# Patient Record
Sex: FEMALE | Race: AMERICAN INDIAN OR ALASKA NATIVE | NOT HISPANIC OR LATINO | Employment: UNEMPLOYED | URBAN - METROPOLITAN AREA
[De-identification: names, ages, dates, MRNs, and addresses within clinical notes are randomized per-mention and may not be internally consistent; named-entity substitution may affect disease eponyms.]

---

## 2017-03-28 PROBLEM — R31.29 MICROHEMATURIA: Status: ACTIVE | Noted: 2017-03-28

## 2017-11-18 ENCOUNTER — HOSPITAL ENCOUNTER (EMERGENCY)
Facility: HOSPITAL | Age: 37
Discharge: HOME | End: 2017-11-18
Attending: SURGERY
Payer: COMMERCIAL

## 2017-11-18 VITALS
TEMPERATURE: 98 F | SYSTOLIC BLOOD PRESSURE: 140 MMHG | HEIGHT: 67 IN | DIASTOLIC BLOOD PRESSURE: 70 MMHG | BODY MASS INDEX: 45.99 KG/M2 | OXYGEN SATURATION: 98 % | WEIGHT: 293 LBS | RESPIRATION RATE: 18 BRPM | HEART RATE: 80 BPM

## 2017-11-18 DIAGNOSIS — M54.9 BACK PAIN: ICD-10-CM

## 2017-11-18 LAB
ALBUMIN SERPL BCP-MCNC: 3.9 G/DL
ALP SERPL-CCNC: 65 U/L
ALT SERPL W/O P-5'-P-CCNC: 41 U/L
AMPHET+METHAMPHET UR QL: NEGATIVE
ANION GAP SERPL CALC-SCNC: 12 MMOL/L
APTT BLDCRRT: 27.4 SEC
AST SERPL-CCNC: 31 U/L
B-HCG UR QL: NEGATIVE
BARBITURATES UR QL SCN>200 NG/ML: NEGATIVE
BASOPHILS # BLD AUTO: 0.03 K/UL
BASOPHILS NFR BLD: 0.4 %
BENZODIAZ UR QL SCN>200 NG/ML: NEGATIVE
BILIRUB SERPL-MCNC: 1.1 MG/DL
BILIRUB UR QL STRIP: NEGATIVE
BNP SERPL-MCNC: <10 PG/ML
BUN SERPL-MCNC: 16 MG/DL
BZE UR QL SCN: NEGATIVE
CALCIUM SERPL-MCNC: 9.6 MG/DL
CANNABINOIDS UR QL SCN: NEGATIVE
CHLORIDE SERPL-SCNC: 105 MMOL/L
CK MB SERPL-MCNC: 1.8 NG/ML
CK MB SERPL-RTO: 0.7 %
CK SERPL-CCNC: 243 U/L
CK SERPL-CCNC: 243 U/L
CLARITY UR: CLEAR
CO2 SERPL-SCNC: 24 MMOL/L
COLOR UR: YELLOW
CREAT SERPL-MCNC: 1.1 MG/DL
CREAT UR-MCNC: 125.4 MG/DL
D DIMER PPP IA.FEU-MCNC: <0.19 MG/L FEU
DIFFERENTIAL METHOD: ABNORMAL
EOSINOPHIL # BLD AUTO: 0.3 K/UL
EOSINOPHIL NFR BLD: 3.8 %
ERYTHROCYTE [DISTWIDTH] IN BLOOD BY AUTOMATED COUNT: 14.8 %
EST. GFR  (AFRICAN AMERICAN): >60 ML/MIN/1.73 M^2
EST. GFR  (NON AFRICAN AMERICAN): >60 ML/MIN/1.73 M^2
GLUCOSE SERPL-MCNC: 101 MG/DL
GLUCOSE UR QL STRIP: NEGATIVE
HCT VFR BLD AUTO: 40.1 %
HGB BLD-MCNC: 13 G/DL
HGB UR QL STRIP: NEGATIVE
INR PPP: 1
KETONES UR QL STRIP: NEGATIVE
LEUKOCYTE ESTERASE UR QL STRIP: NEGATIVE
LYMPHOCYTES # BLD AUTO: 2.7 K/UL
LYMPHOCYTES NFR BLD: 33.8 %
MCH RBC QN AUTO: 25.8 PG
MCHC RBC AUTO-ENTMCNC: 32.4 G/DL
MCV RBC AUTO: 80 FL
METHADONE UR QL SCN>300 NG/ML: NEGATIVE
MONOCYTES # BLD AUTO: 0.5 K/UL
MONOCYTES NFR BLD: 6.1 %
NEUTROPHILS # BLD AUTO: 4.4 K/UL
NEUTROPHILS NFR BLD: 55.9 %
NITRITE UR QL STRIP: NEGATIVE
OPIATES UR QL SCN: NEGATIVE
PCP UR QL SCN>25 NG/ML: NEGATIVE
PH UR STRIP: 6 [PH] (ref 5–8)
PLATELET # BLD AUTO: 268 K/UL
PMV BLD AUTO: 10.8 FL
POTASSIUM SERPL-SCNC: 4.1 MMOL/L
PROT SERPL-MCNC: 7.8 G/DL
PROT UR QL STRIP: NEGATIVE
PROTHROMBIN TIME: 10.3 SEC
RBC # BLD AUTO: 5.04 M/UL
SODIUM SERPL-SCNC: 141 MMOL/L
SP GR UR STRIP: 1.02 (ref 1–1.03)
TOXICOLOGY INFORMATION: NORMAL
TROPONIN I SERPL DL<=0.01 NG/ML-MCNC: <0.006 NG/ML
URN SPEC COLLECT METH UR: NORMAL
UROBILINOGEN UR STRIP-ACNC: NEGATIVE EU/DL
WBC # BLD AUTO: 7.92 K/UL

## 2017-11-18 PROCEDURE — 63600175 PHARM REV CODE 636 W HCPCS: Performed by: SURGERY

## 2017-11-18 PROCEDURE — 82553 CREATINE MB FRACTION: CPT

## 2017-11-18 PROCEDURE — 83880 ASSAY OF NATRIURETIC PEPTIDE: CPT

## 2017-11-18 PROCEDURE — 93010 ELECTROCARDIOGRAM REPORT: CPT | Mod: ,,, | Performed by: INTERNAL MEDICINE

## 2017-11-18 PROCEDURE — 81003 URINALYSIS AUTO W/O SCOPE: CPT

## 2017-11-18 PROCEDURE — 81025 URINE PREGNANCY TEST: CPT

## 2017-11-18 PROCEDURE — 93005 ELECTROCARDIOGRAM TRACING: CPT

## 2017-11-18 PROCEDURE — 85379 FIBRIN DEGRADATION QUANT: CPT

## 2017-11-18 PROCEDURE — 96372 THER/PROPH/DIAG INJ SC/IM: CPT

## 2017-11-18 PROCEDURE — 80307 DRUG TEST PRSMV CHEM ANLYZR: CPT

## 2017-11-18 PROCEDURE — 85730 THROMBOPLASTIN TIME PARTIAL: CPT

## 2017-11-18 PROCEDURE — 85610 PROTHROMBIN TIME: CPT

## 2017-11-18 PROCEDURE — 36415 COLL VENOUS BLD VENIPUNCTURE: CPT

## 2017-11-18 PROCEDURE — 80053 COMPREHEN METABOLIC PANEL: CPT

## 2017-11-18 PROCEDURE — 85025 COMPLETE CBC W/AUTO DIFF WBC: CPT

## 2017-11-18 PROCEDURE — 99284 EMERGENCY DEPT VISIT MOD MDM: CPT | Mod: 25

## 2017-11-18 PROCEDURE — 84484 ASSAY OF TROPONIN QUANT: CPT

## 2017-11-18 RX ORDER — METHYLPREDNISOLONE 4 MG/1
TABLET ORAL
Qty: 1 PACKAGE | Refills: 0 | Status: ON HOLD | OUTPATIENT
Start: 2017-11-18 | End: 2018-09-27 | Stop reason: HOSPADM

## 2017-11-18 RX ORDER — KETOROLAC TROMETHAMINE 10 MG/1
10 TABLET, FILM COATED ORAL EVERY 6 HOURS PRN
Qty: 15 TABLET | Refills: 0 | Status: ON HOLD | OUTPATIENT
Start: 2017-11-18 | End: 2018-09-27 | Stop reason: HOSPADM

## 2017-11-18 RX ORDER — ORPHENADRINE CITRATE 30 MG/ML
60 INJECTION INTRAMUSCULAR; INTRAVENOUS
Status: COMPLETED | OUTPATIENT
Start: 2017-11-18 | End: 2017-11-18

## 2017-11-18 RX ORDER — KETOROLAC TROMETHAMINE 30 MG/ML
60 INJECTION, SOLUTION INTRAMUSCULAR; INTRAVENOUS
Status: COMPLETED | OUTPATIENT
Start: 2017-11-18 | End: 2017-11-18

## 2017-11-18 RX ORDER — CYCLOBENZAPRINE HCL 10 MG
10 TABLET ORAL 3 TIMES DAILY PRN
Qty: 10 TABLET | Refills: 0 | Status: SHIPPED | OUTPATIENT
Start: 2017-11-18 | End: 2017-11-23

## 2017-11-18 RX ADMIN — KETOROLAC TROMETHAMINE 60 MG: 30 INJECTION, SOLUTION INTRAMUSCULAR at 11:11

## 2017-11-18 RX ADMIN — ORPHENADRINE CITRATE 60 MG: 30 INJECTION INTRAMUSCULAR; INTRAVENOUS at 11:11

## 2017-11-18 NOTE — ED NOTES
Patient discharged home after verbalizing understanding of discharge instructions.  Patient will follow up with PCP next week.  Patient has no questions or concerns at this time.

## 2017-11-18 NOTE — ED PROVIDER NOTES
[facility]                                     2017                   Chief Complaint  37 y.o. female with Mid-back Pain (past 2 weeks, worsen today)    History of Present Illness  Carmencita Bryson presents to the emergency room with right upper back pain for 2 weeks  Patient has been having right sharp upper back pain near the right shoulder blade ×2 wks  This pain is sharp and associated with heavy lifting and activity and deep breaths recently  Patient on exam has clear lung sounds on all fields bilaterally, normal cardiac exam today  Patient shows no bruising or trauma, 98% room air oxygenation on ER triage this afternoon  Pt denies any true cardiac pain, appears musculoskeletal and reproducible with right arm    The history is provided by the patient  Medical history: Anxiety  Surgical history:  and cholecystectomy  ALLERGIES: Codeine and tramadol    Review of Systems and Physical Exam     Review of Systems  -- Constitution - no fever, denies fatigue, no weakness, no chills  -- Eyes - no tearing or redness, no visual disturbance  -- Ear, Nose - no tinnitus or earache, no nasal congestion or discharge  -- Mouth,Throat - no sore throat, no toothache, normal voice, normal swallowing  -- Respiratory - denies cough and congestion, no shortness of breath, no ALEJANDRA  -- Cardiovascular - denies chest pain, no palpitations, denies claudication  -- Gastrointestinal - denies abdominal pain, nausea, vomiting, or diarrhea  -- Genitourinary - no dysuria, no denies flank pain, no hematuria or frequency   -- Musculoskeletal - right upper back pain this week, denies trauma  -- Neurological - no headache, denies weakness or seizure; no LOC  -- Skin - denies pallor, rash, or changes in skin. no hives or welts noted    Vital Signs  -- Her oral temperature is 98 °F (36.7 °C).   -- Her blood pressure is 140/70 and her pulse is 80.   -- Her respiration is 18 and oxygen saturation is 98%.       Physical Exam  -- Nursing note and vitals reviewed  -- Head: Atraumatic. Normocephalic. No obvious abnormality  -- Eyes: Pupils are equal and reactive to light. Normal conjunctiva and lids  -- Neck: Normal range of motion. Neck supple. No masses, trachea midline  -- Cardiac: Normal rate, regular rhythm and normal heart sounds  -- Pulmonary: Normal respiratory effort, breath sounds clear to auscultation  -- Abdominal: Soft, no tenderness. Normal bowel sounds. Normal liver edge  -- Musculoskeletal: Normal range of motion, no effusions. Joints stable   -- Neurological: No focal deficits. Showed good interaction with staff  -- Vascular: Posterior tibial, dorsalis pedis and radial pulses 2+ bilaterally      Emergency Room Course     Labs  --    -- K 4.1   --    -- CO2 24   -- BUN 16   -- CREATININE 1.1   --    -- ALKPHOS 65   -- AST 31   -- ALT 41   -- BILITOT 1.1 (H)   -- ALBUMIN 3.9   -- PROT 7.8   -- WBC 7.92   -- HGB 13.0   -- HCT 40.1   --    --  (H)   --  (H)   -- CPKMB 1.8   -- TROPONINI <0.006   -- INR 1.0   -- BNP <10   -- DDIMER <0.19     EKG  -- The EKG findings today were without concerning findings from baseline    Radiology  -- Chest x-ray showed no infiltrate and showed no acute pathology    Medications Given  -- IM 60 mg Toradol given today in the ER  -- IM 60 mg Norflex given today in the ER    Diagnosis  -- The encounter diagnosis was Back pain.    Disposition and Plan  -- Disposition: home  -- Condition: stable  -- Follow-up: Patient to follow up with José Vanegas MD in 1-2 days.  -- I advised the patient that we have found no life threatening condition today  -- At this time, I believe the patient is clinically stable for discharge.   -- The patient acknowledges that close follow up with a MD is required   -- Patient agrees to comply with all instruction and direction given in the ER    This note is dictated on Dragon Natural Speaking word recognition  program.  There are word recognition mistakes that are occasionally missed on review.           Paco Cervantes MD  11/18/17 3683

## 2017-11-18 NOTE — ED TRIAGE NOTES
A 37 yr old female presents to ED with complaints of pain to right thoracic spine area (subscapular) for past 2 weeks, constantly. This morning became worse and felt dizzy, and tightness to chest with some difficulty breathing lasting for about 1 minute. Still has pain now 6/10 but no as bad as earlier.

## 2017-12-17 PROBLEM — K35.80 ACUTE APPENDICITIS: Status: ACTIVE | Noted: 2017-12-17

## 2017-12-21 ENCOUNTER — HOSPITAL ENCOUNTER (EMERGENCY)
Facility: HOSPITAL | Age: 37
Discharge: HOME | End: 2017-12-22
Attending: SURGERY
Payer: COMMERCIAL

## 2017-12-21 VITALS
DIASTOLIC BLOOD PRESSURE: 86 MMHG | HEART RATE: 90 BPM | BODY MASS INDEX: 53.25 KG/M2 | OXYGEN SATURATION: 95 % | WEIGHT: 293 LBS | RESPIRATION RATE: 18 BRPM | SYSTOLIC BLOOD PRESSURE: 166 MMHG | TEMPERATURE: 97 F

## 2017-12-21 DIAGNOSIS — J40 BRONCHITIS: Primary | ICD-10-CM

## 2017-12-21 LAB
BASOPHILS # BLD AUTO: 0.05 K/UL
BASOPHILS NFR BLD: 0.6 %
DIFFERENTIAL METHOD: ABNORMAL
EOSINOPHIL # BLD AUTO: 0.6 K/UL
EOSINOPHIL NFR BLD: 6.3 %
ERYTHROCYTE [DISTWIDTH] IN BLOOD BY AUTOMATED COUNT: 14.8 %
FLUAV AG SPEC QL IA: NEGATIVE
FLUBV AG SPEC QL IA: NEGATIVE
HCT VFR BLD AUTO: 36 %
HGB BLD-MCNC: 11.9 G/DL
LYMPHOCYTES # BLD AUTO: 2.5 K/UL
LYMPHOCYTES NFR BLD: 28.7 %
MCH RBC QN AUTO: 26.2 PG
MCHC RBC AUTO-ENTMCNC: 33.1 G/DL
MCV RBC AUTO: 79 FL
MONOCYTES # BLD AUTO: 0.5 K/UL
MONOCYTES NFR BLD: 6.1 %
NEUTROPHILS # BLD AUTO: 5.2 K/UL
NEUTROPHILS NFR BLD: 58.3 %
PLATELET # BLD AUTO: 348 K/UL
PMV BLD AUTO: 10.4 FL
RBC # BLD AUTO: 4.55 M/UL
SPECIMEN SOURCE: NORMAL
WBC # BLD AUTO: 8.85 K/UL

## 2017-12-21 PROCEDURE — 80053 COMPREHEN METABOLIC PANEL: CPT

## 2017-12-21 PROCEDURE — 25000242 PHARM REV CODE 250 ALT 637 W/ HCPCS: Performed by: SURGERY

## 2017-12-21 PROCEDURE — 99284 EMERGENCY DEPT VISIT MOD MDM: CPT | Mod: 25

## 2017-12-21 PROCEDURE — 87400 INFLUENZA A/B EACH AG IA: CPT | Mod: 59

## 2017-12-21 PROCEDURE — 85025 COMPLETE CBC W/AUTO DIFF WBC: CPT

## 2017-12-21 PROCEDURE — 36415 COLL VENOUS BLD VENIPUNCTURE: CPT

## 2017-12-21 PROCEDURE — 94640 AIRWAY INHALATION TREATMENT: CPT

## 2017-12-21 PROCEDURE — 63600175 PHARM REV CODE 636 W HCPCS: Performed by: SURGERY

## 2017-12-21 PROCEDURE — 96372 THER/PROPH/DIAG INJ SC/IM: CPT

## 2017-12-21 RX ORDER — IPRATROPIUM BROMIDE AND ALBUTEROL SULFATE 2.5; .5 MG/3ML; MG/3ML
3 SOLUTION RESPIRATORY (INHALATION)
Status: COMPLETED | OUTPATIENT
Start: 2017-12-21 | End: 2017-12-21

## 2017-12-21 RX ORDER — METHYLPREDNISOLONE SOD SUCC 125 MG
125 VIAL (EA) INJECTION
Status: COMPLETED | OUTPATIENT
Start: 2017-12-21 | End: 2017-12-21

## 2017-12-21 RX ADMIN — IPRATROPIUM BROMIDE AND ALBUTEROL SULFATE 3 ML: .5; 3 SOLUTION RESPIRATORY (INHALATION) at 11:12

## 2017-12-21 RX ADMIN — METHYLPREDNISOLONE SODIUM SUCCINATE 125 MG: 125 INJECTION, POWDER, FOR SOLUTION INTRAMUSCULAR; INTRAVENOUS at 11:12

## 2017-12-22 LAB
ALBUMIN SERPL BCP-MCNC: 4 G/DL
ALP SERPL-CCNC: 88 U/L
ALT SERPL W/O P-5'-P-CCNC: 55 U/L
ANION GAP SERPL CALC-SCNC: 12 MMOL/L
AST SERPL-CCNC: 41 U/L
BILIRUB SERPL-MCNC: 0.8 MG/DL
BUN SERPL-MCNC: 6 MG/DL
CALCIUM SERPL-MCNC: 9.9 MG/DL
CHLORIDE SERPL-SCNC: 105 MMOL/L
CO2 SERPL-SCNC: 25 MMOL/L
CREAT SERPL-MCNC: 0.8 MG/DL
EST. GFR  (AFRICAN AMERICAN): >60 ML/MIN/1.73 M^2
EST. GFR  (NON AFRICAN AMERICAN): >60 ML/MIN/1.73 M^2
GLUCOSE SERPL-MCNC: 102 MG/DL
POTASSIUM SERPL-SCNC: 3.6 MMOL/L
PROT SERPL-MCNC: 7.9 G/DL
SODIUM SERPL-SCNC: 142 MMOL/L

## 2017-12-22 RX ORDER — PREDNISONE 20 MG/1
20 TABLET ORAL DAILY
Qty: 3 TABLET | Refills: 0 | Status: SHIPPED | OUTPATIENT
Start: 2017-12-22 | End: 2017-12-25

## 2017-12-22 RX ORDER — AMOXICILLIN AND CLAVULANATE POTASSIUM 875; 125 MG/1; MG/1
1 TABLET, FILM COATED ORAL 2 TIMES DAILY
Qty: 14 TABLET | Refills: 0 | Status: ON HOLD | OUTPATIENT
Start: 2017-12-22 | End: 2018-09-27 | Stop reason: HOSPADM

## 2017-12-22 NOTE — ED PROVIDER NOTES
Encounter Date: 2017       History     Chief Complaint    Cough    Shortness of Breath     HPI   Carmencita Bryson is a 37 y.o. female presents with cough and shortness of breath for a month  Patient states she suffers from chronic cough symptoms, just finished antibiotics recently  Patient states she has a dry cough, 95% room air oxygenation on ER arrival this evening     The history is provided by the patient  Medical history: Anxiety  Surgical history:  and cholecystectomy  ALLERGIES: Codeine and tramadol    Review of Systems and Physical Exam      Review of Systems  · Constitution - no fever, denies fatigue, no weakness, stable weight  · Eyes - no tearing or redness, no change in vision, no double vision  · Ear, Nose - no tinnitus or earache, no nasal congestion or discharge  · Mouth,Throat - no sore throat, no toothache, denies dysphagia, normal swallowing  · Respiratory - cough and shortness of breath, no ALEJANDRA, no wheeze  · Cardiovascular - denies chest pain, no palpitations, denies claudication. No orthopnea   · Gastrointestinal - denies abdominal pain, nausea, vomiting, diarrhea, or constipation.   · Genitourinary - denies flank pain and dysuria, also denies frequency or urgency  · Musculoskeletal - denies muscle or joint pain, back pain  · Skin - denies rash or changes in skin, no hives or welts  · Neurological - no headache, denies weakness or seizure; no LOC  · Lymphatic- no lymphadenopathy or lymphedema noted by patient    BP Pulse Resp Temp SpO2    166/86 90 20 97.3 °F (36.3 °C) 95 %     Physical Exam   -- Nursing note and vitals reviewed  -- Constitutional: Appears well-developed and well-nourished  -- Head: Atraumatic. Normocephalic. No obvious abnormality  -- Eyes: Pupils are equal and reactive to light. Normal conjunctiva and lids  -- Nose: Nose normal in appearance, nares grossly normal. No discharge  -- Throat: Mucous membranes moist, pharynx normal, normal tonsils. No lesions   --  Ears: External ears and TM normal bilaterally. Normal hearing and no drainage  -- Neck: Normal range of motion. Neck supple. No masses, trachea midline  -- Cardiac: Normal rate, regular rhythm and normal heart sounds  -- Pulmonary: faint rhonchi at the bilateral bases with no active wheezing   -- Abdominal: Soft, no tenderness. Normal bowel sounds. Normal liver edge  -- Musculoskeletal: Normal range of motion, no effusions. Joints stable   -- Neurological: No focal deficits. Showed good interaction with staff  -- Vascular: Posterior tibial, dorsalis pedis and radial pulses 2+ bilaterally      ED Course   Procedures  Labs Reviewed   COMPREHENSIVE METABOLIC PANEL - Abnormal; Notable for the following:        Result Value    AST 41 (*)     ALT 55 (*)     All other components within normal limits   CBC W/ AUTO DIFFERENTIAL - Abnormal; Notable for the following:     Hemoglobin 11.9 (*)     Hematocrit 36.0 (*)     MCV 79 (*)     MCH 26.2 (*)     RDW 14.8 (*)     Eos # 0.6 (*)     All other components within normal limits   INFLUENZA A AND B ANTIGEN                               ED Course      Clinical Impression:   The encounter diagnosis was Bronchitis.          Change of Shift  -- This patient was taken over by Dr. Champagne at shift change  -- Workup is pending, oncoming physician will follow-up and give disposition  -- Pt is completely stable and appropriate handoff was given by the outgoing MD Paco Cervantes MD  12/23/17 9582

## 2017-12-22 NOTE — ED TRIAGE NOTES
Arrives per self transport from home. Presents with cough, congestion, and SOB. Reports that she was treated for bronchitis recently but symptoms have not improved

## 2018-09-25 ENCOUNTER — HOSPITAL ENCOUNTER (INPATIENT)
Facility: HOSPITAL | Age: 38
LOS: 2 days | Discharge: HOME | DRG: 189 | End: 2018-09-27
Attending: SURGERY | Admitting: INTERNAL MEDICINE
Payer: COMMERCIAL

## 2018-09-25 DIAGNOSIS — J96.01 ACUTE HYPOXEMIC RESPIRATORY FAILURE: ICD-10-CM

## 2018-09-25 DIAGNOSIS — R09.02 HYPOXIA: Primary | ICD-10-CM

## 2018-09-25 DIAGNOSIS — J40 BRONCHITIS: ICD-10-CM

## 2018-09-25 DIAGNOSIS — R06.02 SOB (SHORTNESS OF BREATH): ICD-10-CM

## 2018-09-25 PROBLEM — F17.200 SMOKER: Status: ACTIVE | Noted: 2018-09-25

## 2018-09-25 PROBLEM — J21.8 ACUTE BRONCHIOLITIS DUE TO OTHER SPECIFIED ORGANISMS: Status: ACTIVE | Noted: 2018-09-25

## 2018-09-25 PROBLEM — J20.8 ACUTE BRONCHITIS DUE TO OTHER SPECIFIED ORGANISMS: Status: ACTIVE | Noted: 2018-09-25

## 2018-09-25 LAB
ALBUMIN SERPL BCP-MCNC: 3.9 G/DL
ALLENS TEST: ABNORMAL
ALP SERPL-CCNC: 81 U/L
ALT SERPL W/O P-5'-P-CCNC: 41 U/L
ANION GAP SERPL CALC-SCNC: 12 MMOL/L
APTT BLDCRRT: 28.8 SEC
AST SERPL-CCNC: 46 U/L
BASOPHILS # BLD AUTO: 0.06 K/UL
BASOPHILS NFR BLD: 0.4 %
BILIRUB SERPL-MCNC: 1.2 MG/DL
BNP SERPL-MCNC: <10 PG/ML
BUN SERPL-MCNC: 11 MG/DL
CALCIUM SERPL-MCNC: 9.9 MG/DL
CHLORIDE SERPL-SCNC: 106 MMOL/L
CK MB SERPL-MCNC: 9.3 NG/ML
CK MB SERPL-RTO: 0.9 %
CK SERPL-CCNC: 996 U/L
CK SERPL-CCNC: 996 U/L
CO2 SERPL-SCNC: 21 MMOL/L
CREAT SERPL-MCNC: 0.8 MG/DL
D DIMER PPP IA.FEU-MCNC: 0.24 MG/L FEU
DELSYS: ABNORMAL
DIFFERENTIAL METHOD: ABNORMAL
EOSINOPHIL # BLD AUTO: 1 K/UL
EOSINOPHIL NFR BLD: 6.2 %
ERYTHROCYTE [DISTWIDTH] IN BLOOD BY AUTOMATED COUNT: 14.3 %
EST. GFR  (AFRICAN AMERICAN): >60 ML/MIN/1.73 M^2
EST. GFR  (NON AFRICAN AMERICAN): >60 ML/MIN/1.73 M^2
FLUAV AG SPEC QL IA: NEGATIVE
FLUBV AG SPEC QL IA: NEGATIVE
GLUCOSE SERPL-MCNC: 111 MG/DL
HCO3 UR-SCNC: 25.7 MMOL/L (ref 22–26)
HCT VFR BLD AUTO: 38 %
HGB BLD-MCNC: 12.8 G/DL
INR PPP: 1
LYMPHOCYTES # BLD AUTO: 4.1 K/UL
LYMPHOCYTES NFR BLD: 26.3 %
MCH RBC QN AUTO: 26.8 PG
MCHC RBC AUTO-ENTMCNC: 33.7 G/DL
MCV RBC AUTO: 80 FL
MONOCYTES # BLD AUTO: 1.1 K/UL
MONOCYTES NFR BLD: 6.9 %
NEUTROPHILS # BLD AUTO: 9.4 K/UL
NEUTROPHILS NFR BLD: 60.2 %
PCO2 BLDA: 37 MMHG (ref 35–45)
PH SMN: 7.45 [PH] (ref 7.35–7.45)
PLATELET # BLD AUTO: 286 K/UL
PMV BLD AUTO: 10.6 FL
PO2 BLDA: 57 MMHG (ref 75–100)
POC BE: 1.8 MMOL/L (ref -2–2)
POC COHB: 0.6 % (ref 0–3)
POC METHB: 0.5 % (ref 0–1.5)
POC O2HB ARTERIAL: 91.9 % (ref 94–100)
POC SATURATED O2: 92.9 % (ref 90–100)
POC TCO2: 26.8 MMOL/L
POC THB: 13.1 G/DL (ref 12–18)
POTASSIUM SERPL-SCNC: 4 MMOL/L
PROT SERPL-MCNC: 7.2 G/DL
PROTHROMBIN TIME: 10.3 SEC
RBC # BLD AUTO: 4.77 M/UL
SITE: ABNORMAL
SODIUM SERPL-SCNC: 139 MMOL/L
SPECIMEN SOURCE: NORMAL
TROPONIN I SERPL DL<=0.01 NG/ML-MCNC: <0.006 NG/ML
WBC # BLD AUTO: 15.66 K/UL

## 2018-09-25 PROCEDURE — 82803 BLOOD GASES ANY COMBINATION: CPT | Performed by: SURGERY

## 2018-09-25 PROCEDURE — 99220 PR INITIAL OBSERVATION CARE,LEVL III: CPT | Mod: ,,, | Performed by: INTERNAL MEDICINE

## 2018-09-25 PROCEDURE — 36415 COLL VENOUS BLD VENIPUNCTURE: CPT

## 2018-09-25 PROCEDURE — 94761 N-INVAS EAR/PLS OXIMETRY MLT: CPT

## 2018-09-25 PROCEDURE — 84484 ASSAY OF TROPONIN QUANT: CPT

## 2018-09-25 PROCEDURE — 99285 EMERGENCY DEPT VISIT HI MDM: CPT | Mod: 25

## 2018-09-25 PROCEDURE — 85610 PROTHROMBIN TIME: CPT

## 2018-09-25 PROCEDURE — 25000003 PHARM REV CODE 250: Performed by: SURGERY

## 2018-09-25 PROCEDURE — 85025 COMPLETE CBC W/AUTO DIFF WBC: CPT

## 2018-09-25 PROCEDURE — 85379 FIBRIN DEGRADATION QUANT: CPT

## 2018-09-25 PROCEDURE — 27000221 HC OXYGEN, UP TO 24 HOURS

## 2018-09-25 PROCEDURE — 63600175 PHARM REV CODE 636 W HCPCS: Performed by: SURGERY

## 2018-09-25 PROCEDURE — 87400 INFLUENZA A/B EACH AG IA: CPT

## 2018-09-25 PROCEDURE — 96375 TX/PRO/DX INJ NEW DRUG ADDON: CPT

## 2018-09-25 PROCEDURE — 94640 AIRWAY INHALATION TREATMENT: CPT

## 2018-09-25 PROCEDURE — 25000003 PHARM REV CODE 250: Performed by: INTERNAL MEDICINE

## 2018-09-25 PROCEDURE — 80053 COMPREHEN METABOLIC PANEL: CPT

## 2018-09-25 PROCEDURE — 83880 ASSAY OF NATRIURETIC PEPTIDE: CPT

## 2018-09-25 PROCEDURE — 25000242 PHARM REV CODE 250 ALT 637 W/ HCPCS: Performed by: SURGERY

## 2018-09-25 PROCEDURE — 82550 ASSAY OF CK (CPK): CPT

## 2018-09-25 PROCEDURE — 85730 THROMBOPLASTIN TIME PARTIAL: CPT

## 2018-09-25 PROCEDURE — 82553 CREATINE MB FRACTION: CPT

## 2018-09-25 PROCEDURE — 96376 TX/PRO/DX INJ SAME DRUG ADON: CPT

## 2018-09-25 PROCEDURE — 11000001 HC ACUTE MED/SURG PRIVATE ROOM

## 2018-09-25 PROCEDURE — 93010 ELECTROCARDIOGRAM REPORT: CPT | Mod: ,,, | Performed by: INTERNAL MEDICINE

## 2018-09-25 PROCEDURE — 96365 THER/PROPH/DIAG IV INF INIT: CPT

## 2018-09-25 PROCEDURE — 93005 ELECTROCARDIOGRAM TRACING: CPT

## 2018-09-25 RX ORDER — METHYLPREDNISOLONE SOD SUCC 125 MG
125 VIAL (EA) INJECTION EVERY 8 HOURS
Status: DISCONTINUED | OUTPATIENT
Start: 2018-09-25 | End: 2018-09-27 | Stop reason: HOSPADM

## 2018-09-25 RX ORDER — IBUPROFEN 400 MG/1
400 TABLET ORAL EVERY 6 HOURS PRN
Status: DISCONTINUED | OUTPATIENT
Start: 2018-09-25 | End: 2018-09-27 | Stop reason: HOSPADM

## 2018-09-25 RX ORDER — ACETAMINOPHEN 500 MG
1000 TABLET ORAL
Status: COMPLETED | OUTPATIENT
Start: 2018-09-25 | End: 2018-09-25

## 2018-09-25 RX ORDER — PANTOPRAZOLE SODIUM 40 MG/1
40 TABLET, DELAYED RELEASE ORAL DAILY
Status: DISCONTINUED | OUTPATIENT
Start: 2018-09-25 | End: 2018-09-27 | Stop reason: HOSPADM

## 2018-09-25 RX ORDER — IPRATROPIUM BROMIDE AND ALBUTEROL SULFATE 2.5; .5 MG/3ML; MG/3ML
3 SOLUTION RESPIRATORY (INHALATION) EVERY 4 HOURS
Status: DISCONTINUED | OUTPATIENT
Start: 2018-09-25 | End: 2018-09-27 | Stop reason: HOSPADM

## 2018-09-25 RX ORDER — IBUPROFEN 800 MG/1
800 TABLET ORAL
Status: COMPLETED | OUTPATIENT
Start: 2018-09-25 | End: 2018-09-25

## 2018-09-25 RX ORDER — IPRATROPIUM BROMIDE AND ALBUTEROL SULFATE 2.5; .5 MG/3ML; MG/3ML
3 SOLUTION RESPIRATORY (INHALATION)
Status: COMPLETED | OUTPATIENT
Start: 2018-09-25 | End: 2018-09-25

## 2018-09-25 RX ORDER — ACETAMINOPHEN 325 MG/1
650 TABLET ORAL EVERY 8 HOURS PRN
Status: DISCONTINUED | OUTPATIENT
Start: 2018-09-25 | End: 2018-09-27 | Stop reason: HOSPADM

## 2018-09-25 RX ORDER — ONDANSETRON 2 MG/ML
4 INJECTION INTRAMUSCULAR; INTRAVENOUS EVERY 8 HOURS PRN
Status: DISCONTINUED | OUTPATIENT
Start: 2018-09-25 | End: 2018-09-27 | Stop reason: HOSPADM

## 2018-09-25 RX ORDER — METHYLPREDNISOLONE SOD SUCC 125 MG
125 VIAL (EA) INJECTION
Status: COMPLETED | OUTPATIENT
Start: 2018-09-25 | End: 2018-09-25

## 2018-09-25 RX ORDER — IBUPROFEN 200 MG
1 TABLET ORAL DAILY
Status: DISCONTINUED | OUTPATIENT
Start: 2018-09-25 | End: 2018-09-27 | Stop reason: HOSPADM

## 2018-09-25 RX ADMIN — ACETAMINOPHEN 650 MG: 325 TABLET ORAL at 11:09

## 2018-09-25 RX ADMIN — IPRATROPIUM BROMIDE AND ALBUTEROL SULFATE 3 ML: .5; 3 SOLUTION RESPIRATORY (INHALATION) at 04:09

## 2018-09-25 RX ADMIN — METHYLPREDNISOLONE SODIUM SUCCINATE 125 MG: 125 INJECTION, POWDER, FOR SOLUTION INTRAMUSCULAR; INTRAVENOUS at 02:09

## 2018-09-25 RX ADMIN — ACETAMINOPHEN 1000 MG: 500 TABLET ORAL at 02:09

## 2018-09-25 RX ADMIN — IBUPROFEN 800 MG: 800 TABLET ORAL at 02:09

## 2018-09-25 RX ADMIN — METHYLPREDNISOLONE SODIUM SUCCINATE 125 MG: 125 INJECTION, POWDER, FOR SOLUTION INTRAMUSCULAR; INTRAVENOUS at 10:09

## 2018-09-25 RX ADMIN — CEFTRIAXONE 1 G: 1 INJECTION, SOLUTION INTRAVENOUS at 06:09

## 2018-09-25 RX ADMIN — METHYLPREDNISOLONE SODIUM SUCCINATE 125 MG: 125 INJECTION, POWDER, FOR SOLUTION INTRAMUSCULAR; INTRAVENOUS at 06:09

## 2018-09-25 RX ADMIN — AZITHROMYCIN MONOHYDRATE 500 MG: 500 INJECTION, POWDER, LYOPHILIZED, FOR SOLUTION INTRAVENOUS at 06:09

## 2018-09-25 RX ADMIN — METHYLPREDNISOLONE SODIUM SUCCINATE 125 MG: 125 INJECTION, POWDER, FOR SOLUTION INTRAMUSCULAR; INTRAVENOUS at 01:09

## 2018-09-25 RX ADMIN — IPRATROPIUM BROMIDE AND ALBUTEROL SULFATE 3 ML: .5; 3 SOLUTION RESPIRATORY (INHALATION) at 11:09

## 2018-09-25 RX ADMIN — IPRATROPIUM BROMIDE AND ALBUTEROL SULFATE 3 ML: .5; 3 SOLUTION RESPIRATORY (INHALATION) at 07:09

## 2018-09-25 RX ADMIN — IPRATROPIUM BROMIDE AND ALBUTEROL SULFATE 3 ML: .5; 3 SOLUTION RESPIRATORY (INHALATION) at 02:09

## 2018-09-25 RX ADMIN — IPRATROPIUM BROMIDE AND ALBUTEROL SULFATE 3 ML: .5; 3 SOLUTION RESPIRATORY (INHALATION) at 01:09

## 2018-09-25 RX ADMIN — IBUPROFEN 400 MG: 400 TABLET ORAL at 08:09

## 2018-09-25 NOTE — PLAN OF CARE
09/25/18 1158   Discharge Assessment   Assessment Type Discharge Planning Assessment   Confirmed/corrected address and phone number on facesheet? Yes   Assessment information obtained from? Patient;Medical Record   Expected Length of Stay (days) 2   Communicated expected length of stay with patient/caregiver yes   Prior to hospitilization cognitive status: Alert/Oriented   Prior to hospitalization functional status: Independent   Current cognitive status: Alert/Oriented   Current Functional Status: Independent   Lives With spouse   Able to Return to Prior Arrangements yes   Is patient able to care for self after discharge? Yes   Who are your caregiver(s) and their phone number(s)? Komal yee - [phone]   Patient's perception of discharge disposition home or selfcare   Readmission Within The Last 30 Days no previous admission in last 30 days   Patient currently being followed by outpatient case management? No   Patient currently receives any other outside agency services? No   Equipment Currently Used at Home none   Do you have any problems affording any of your prescribed medications? No   Is the patient taking medications as prescribed? yes   Does the patient have transportation home? Yes   Transportation Available family or friend will provide;car   Does the patient receive services at the [facility]? No   Discharge Plan A Home   Discharge Plan B Home   Patient/Family In Agreement With Plan yes       Pt is a 38 year old female admitted for SOB.  Pt lives with spouse and is very independent.  No Social Work needs noted at this time. Will continue to follow and offer support as needed.    Tyrese Martin LMSW

## 2018-09-25 NOTE — ED PROVIDER NOTES
[facility]                                                 Chief Complaint  38 y.o. female with URI    History of Present Illness  Carmencita Bryson presents to the emergency room with wheezing this evening  Pt presents to the emergency room tonight with an oxygen saturation of 88%  Patient has wheezing all fields with diminished air exchange, no asthma history  Patient has been having a dry hacking cough for the last month, worse last week  Patient has no fever, no history of hypoxia, has never required oxygen in the past  IV steroids given the ER has helped, patients there with a 90% room air oxygen    The history is provided by the patient   device was not used during this ER visit  Medical history: anxiety  Surgeries: appendectomy  Allergies: tramadol    Review of Systems and Physical Exam      Review of Systems  -- Constitution - no fever, denies fatigue, no weakness, no chills  -- Eyes - no tearing or redness, no visual disturbance  -- Ear, Nose - no tinnitus or earache, no nasal congestion or discharge  -- Mouth,Throat - no sore throat, no toothache, normal voice, normal swallowing  -- Respiratory -  Coughing wheezing and shortness of breath  -- Cardiovascular - denies chest pain, no palpitations, denies claudication  -- Gastrointestinal - denies abdominal pain, nausea, vomiting, or diarrhea  -- Genitourinary - no dysuria, denies flank pain, no hematuria, no STD risk  -- Musculoskeletal - denies back pain, negative for myalgias and arthralgias   -- Neurological - no headache, denies weakness or seizure; no LOC  -- Skin - denies pallor, rash, or changes in skin. no hives or welts noted  -- Psychiatric - Denies SI or HI, no psychosis or fractured thought noted     Vital Signs  Her oral temperature is 97.3 °F (36.3 °C).   Her blood pressure is 129/60 and her pulse is 94.   Her respiration is 22 (abnormal) and oxygen saturation is 93 (abnormal)%.     Physical Exam  -- Nursing  note and vitals reviewed  -- Constitutional: Appears well-developed and well-nourished  -- Head: Atraumatic. Normocephalic. No obvious abnormality  -- Eyes: Pupils are equal and reactive to light. Normal conjunctiva and lids  -- Nose: Nose normal in appearance, nares grossly normal. No discharge  -- Throat: Mucous membranes moist, pharynx normal, normal tonsils. No lesions   -- Ears: External ears and TM normal bilaterally. Normal hearing and no drainage  -- Neck: Normal range of motion. Neck supple. No masses, trachea midline  -- Cardiac: Normal rate, regular rhythm and normal heart sounds  -- Pulmonary:  Significant wheezing in all fields with diminished air exchange  -- Abdominal: Soft, no tenderness. Normal bowel sounds. Normal liver edge  -- Musculoskeletal: Normal range of motion, no effusions. Joints stable   -- Neurological: No focal deficits. Showed good interaction with staff  -- Vascular: Posterior tibial, dorsalis pedis and radial pulses 2+ bilaterally    -- Lymphatics: No cervical or peripheral lymphadenopathy. No edema noted  -- Skin: Warm and dry. No evidence of rash or cellulitis  -- Psychiatric: Normal mood and affect. Bedside behavior is appropriate    Emergency Room Course      Lab Results     K 4.0      CO2 21 (L)   BUN 11   CREATININE 0.8    (H)   ALKPHOS 81   AST 46 (H)   ALT 41   BILITOT 1.2 (H)   ALBUMIN 3.9   PROT 7.2   WBC 15.66 (H)   HGB 12.8   HCT 38.0       (H)    (H)   CPKMB 9.3 (H)   TROPONINI <0.006   INR 1.0   BNP <10   DDIMER 0.24   LACTATE 0.9     EKG  -- The EKG findings today were without concerning findings from baseline    Radiology  -- Chest x-ray showed no infiltrate and showed no acute pathology    Additional Work up  -- Blood cultures have also been drawn, results are pending     Medications Given  cefTRIAXone (ROCEPHIN) 1 g in dextrose 5 % 50 mL IVPB (0 g Intravenous Stopped 9/25/18 0628)   azithromycin 500 mg in dextrose 5 % 250 mL  IVPB (ready to mix system) (500 mg Intravenous New Bag 9/25/18)   albuterol-ipratropium 2.5 mg-0.5 mg/3 mL nebulizer solution 3 mL (3 mLs Nebulization Given 9/25/18 0112)   methylPREDNISolone sodium succinate injection 125 mg (125 mg Intravenous Given 9/25/18 0130)   albuterol-ipratropium 2.5 mg-0.5 mg/3 mL nebulizer solution 3 mL (3 mLs Nebulization Given 9/25/18 0247)   acetaminophen tablet 1,000 mg (1,000 mg Oral Given 9/25/18 0252)   ibuprofen tablet 800 mg (800 mg Oral Given 9/25/18 0252)     Diagnosis  -- The primary encounter diagnosis was Hypoxia.   -- Diagnoses of SOB (shortness of breath) and Bronchitis were also pertinent to this visit.    Disposition and Plan  -- Disposition: observation  -- Condition: stable    This note is dictated on Dragon Natural Speaking word recognition program.  There are word recognition mistakes that are occasionally missed on review.         Paco Cervantes MD  09/25/18 0686

## 2018-09-25 NOTE — SUBJECTIVE & OBJECTIVE
Past Medical History:   Diagnosis Date    Anxiety        Past Surgical History:   Procedure Laterality Date    APPENDECTOMY      APPENDECTOMY-LAPAROSCOPIC N/A 2017    Performed by [facility]uer [facility] at [facility] OR     SECTION      CHOLECYSTECTOMY         Review of patient's allergies indicates:   Allergen Reactions    Tramadol Rash       No current facility-administered medications on file prior to encounter.      Current Outpatient Medications on File Prior to Encounter   Medication Sig    diazePAM (VALIUM) 5 MG tablet Take 5 mg by mouth every 6 (six) hours as needed for Anxiety.    albuterol 90 mcg/actuation inhaler Inhale 1-2 puffs into the lungs every 6 (six) hours as needed for Wheezing. Rescue    amoxicillin-clavulanate 875-125mg (AUGMENTIN) 875-125 mg per tablet Take 1 tablet by mouth 2 (two) times daily.    cetirizine (ZYRTEC) 10 MG tablet Take 10 mg by mouth once daily.    ciclopirox (LOPROX) 0.77 % Crea Apply topically 2 (two) times daily.    ketorolac (TORADOL) 10 mg tablet Take 1 tablet (10 mg total) by mouth every 6 (six) hours as needed for Pain.    methylPREDNISolone (MEDROL DOSEPACK) 4 mg tablet Pack as directed    ondansetron (ZOFRAN-ODT) 4 MG TbDL Take 1 tablet (4 mg total) by mouth every 8 (eight) hours as needed (nausea).    oxycodone-acetaminophen (PERCOCET)  mg per tablet Take 1 tablet by mouth every 4 (four) hours as needed for Pain.    triamcinolone acetonide 0.1% (KENALOG) 0.1 % cream Apply topically 2 (two) times daily. Apply to each ear canal bid     Family History     Problem Relation (Age of Onset)    Hypertension Father        Tobacco Use    Smoking status: Current Every Day Smoker     Packs/day: 1.00     Years: 1.00     Pack years: 1.00     Types: Cigarettes     Start date: 2017    Smokeless tobacco: Never Used   Substance and Sexual Activity    Alcohol use: Yes     Comment: occ    Drug use: No    Sexual activity: Yes     Partners: Male      Review of Systems   Constitutional: Negative for chills, fatigue and fever.   HENT: Negative for congestion, ear pain, postnasal drip, sinus pressure, sneezing and sore throat.    Eyes: Negative for discharge.   Respiratory: Positive for cough, chest tightness, shortness of breath and wheezing.    Cardiovascular: Negative.    Gastrointestinal: Negative for abdominal pain, constipation, diarrhea, nausea and vomiting.   Genitourinary: Negative for difficulty urinating, flank pain and hematuria.   Musculoskeletal: Negative.  Negative for arthralgias and joint swelling.   Skin: Negative.    Neurological: Positive for headaches. Negative for dizziness.   Psychiatric/Behavioral: Negative for behavioral problems and confusion.     Objective:     Vital Signs (Most Recent):  Temp: 97.3 °F (36.3 °C) (09/25/18 0706)  Pulse: 79 (09/25/18 0800)  Resp: 16 (09/25/18 0751)  BP: 129/60 (09/25/18 0706)  SpO2: (!) 92 % (09/25/18 0751) Vital Signs (24h Range):  Temp:  [97.3 °F (36.3 °C)-97.6 °F (36.4 °C)] 97.3 °F (36.3 °C)  Pulse:  [] 79  Resp:  [16-24] 16  SpO2:  [88 %-93 %] 92 %  BP: (129-144)/(60-87) 129/60     Weight: 136.1 kg (300 lb)  Body mass index is 46.99 kg/m².    Physical Exam   Constitutional: She is oriented to person, place, and time. She appears well-developed and well-nourished.   HENT:   Head: Normocephalic and atraumatic.   Right Ear: External ear normal.   Left Ear: External ear normal.   Nose: Nose normal.   Mouth/Throat: Oropharynx is clear and moist.   Eyes: Conjunctivae and EOM are normal. Pupils are equal, round, and reactive to light.   Neck: Normal range of motion. Neck supple. No thyromegaly present.   Cardiovascular: Normal rate, regular rhythm, normal heart sounds and intact distal pulses.   Pulmonary/Chest: She is in respiratory distress. She has wheezes. She has rales.   Abdominal: Soft. Bowel sounds are normal.   Musculoskeletal: Normal range of motion.   Neurological: She is alert and  oriented to person, place, and time. She has normal reflexes.   Skin: Skin is warm and dry.   Psychiatric: She has a normal mood and affect. Her behavior is normal. Judgment and thought content normal.   Nursing note and vitals reviewed.        CRANIAL NERVES     CN III, IV, VI   Pupils are equal, round, and reactive to light.  Extraocular motions are normal.        Significant Labs:     Recent Labs   Lab  09/25/18   0110   PH  7.45   PCO2  37   HCO3  25.70*   POCSATURATED  92.9   BE  1.80   TOTALHB  13.1   COHB  0.6   METHB  0.5     Blood Culture: No results for input(s): LABBLOO in the last 48 hours.  CBC:   Recent Labs   Lab  09/25/18 0109   WBC  15.66*   HGB  12.8   HCT  38.0   PLT  286     CMP:   Recent Labs   Lab  09/25/18 0109   NA  139   K  4.0   CL  106   CO2  21*   GLU  111*   BUN  11   CREATININE  0.8   CALCIUM  9.9   PROT  7.2   ALBUMIN  3.9   BILITOT  1.2*   ALKPHOS  81   AST  46*   ALT  41   ANIONGAP  12   EGFRNONAA  >60   Troponin:   Recent Labs   Lab  09/25/18 0109   TROPONINI  <0.006       Significant Imaging: see below   CXR :No acute abnormality

## 2018-09-25 NOTE — ED TRIAGE NOTES
.  38 y.o. female presents to ER   Chief Complaint   Patient presents with    URI   pt c/o cough, runny nose, headache, back pain that started 4 days ago. No acute distress noted.

## 2018-09-25 NOTE — PLAN OF CARE
Problem: Patient Care Overview  Goal: Plan of Care Review  Outcome: Ongoing (interventions implemented as appropriate)  Patient received from ER. Report received from BONITA Becerril. Patient stable, VS stable. IV antibiotics running. O2 nasal canula 2 L. Patient ambulated to restroom on own.

## 2018-09-25 NOTE — PLAN OF CARE
Problem: Patient Care Overview  Goal: Plan of Care Review  Outcome: Ongoing (interventions implemented as appropriate)  Vitals stable, afebrile. Complained of headache that was relieved with PO meds. Ambulating, eating, and voiding well. ALEJANDRA. Cough nonproductive. NC 3-4L. sats holding 95%. Wheezing, inspiratory and expiratory. Solumedrol given. Discussed plan of care with pt, stated understanding.

## 2018-09-25 NOTE — ASSESSMENT & PLAN NOTE
o2- sat 88% on RA  Neb tx  Continue IV steroids  ABX rocephin  Possible d/c tomorrow if wheezing and sat better

## 2018-09-25 NOTE — HPI
9/25/18 Carmencita Bryson is a 38 y.o. female presented to ER last PM  with wheezing with an oxygen saturation of 88%  Patient had wheezing all fields with diminished air exchange, no asthma history  Patient has been having a dry hacking cough for the last month, worse last week  Patient had no fever, no history of hypoxia, has never required oxygen in the past  IV steroids given the ER has helped, patients there with a 90% room air oxygen;  She was given IV Rocephin, zithromax and Solumedrol 125mg IV x 1   9/25 feeling a lot  better this am; states she has not smoked in 4 days. Cough is not as bad.

## 2018-09-25 NOTE — ASSESSMENT & PLAN NOTE
Nicotine patch   long discussion with pt regarding risks of smoking and long term damage  Refer to Smoking cessation as OP

## 2018-09-26 PROBLEM — F41.9 ANXIETY: Status: ACTIVE | Noted: 2018-09-26

## 2018-09-26 PROBLEM — K59.00 CONSTIPATION: Status: ACTIVE | Noted: 2018-09-26

## 2018-09-26 LAB
ALBUMIN SERPL BCP-MCNC: 3.9 G/DL
ALP SERPL-CCNC: 82 U/L
ALT SERPL W/O P-5'-P-CCNC: 39 U/L
ANION GAP SERPL CALC-SCNC: 12 MMOL/L
AST SERPL-CCNC: 25 U/L
BASOPHILS # BLD AUTO: 0.01 K/UL
BASOPHILS NFR BLD: 0.1 %
BILIRUB SERPL-MCNC: 0.7 MG/DL
BUN SERPL-MCNC: 11 MG/DL
CALCIUM SERPL-MCNC: 10 MG/DL
CHLORIDE SERPL-SCNC: 106 MMOL/L
CO2 SERPL-SCNC: 22 MMOL/L
CREAT SERPL-MCNC: 0.8 MG/DL
DIFFERENTIAL METHOD: ABNORMAL
EOSINOPHIL # BLD AUTO: 0 K/UL
EOSINOPHIL NFR BLD: 0 %
ERYTHROCYTE [DISTWIDTH] IN BLOOD BY AUTOMATED COUNT: 14.8 %
EST. GFR  (AFRICAN AMERICAN): >60 ML/MIN/1.73 M^2
EST. GFR  (NON AFRICAN AMERICAN): >60 ML/MIN/1.73 M^2
GLUCOSE SERPL-MCNC: 228 MG/DL
HCT VFR BLD AUTO: 39.2 %
HGB BLD-MCNC: 12.9 G/DL
LYMPHOCYTES # BLD AUTO: 1.8 K/UL
LYMPHOCYTES NFR BLD: 10.2 %
MCH RBC QN AUTO: 26.5 PG
MCHC RBC AUTO-ENTMCNC: 32.9 G/DL
MCV RBC AUTO: 81 FL
MONOCYTES # BLD AUTO: 0.6 K/UL
MONOCYTES NFR BLD: 3.7 %
NEUTROPHILS # BLD AUTO: 15.1 K/UL
NEUTROPHILS NFR BLD: 86 %
PLATELET # BLD AUTO: 315 K/UL
PMV BLD AUTO: 11.2 FL
POTASSIUM SERPL-SCNC: 4.5 MMOL/L
PROT SERPL-MCNC: 7.5 G/DL
RBC # BLD AUTO: 4.86 M/UL
SODIUM SERPL-SCNC: 140 MMOL/L
WBC # BLD AUTO: 17.5 K/UL

## 2018-09-26 PROCEDURE — 63600175 PHARM REV CODE 636 W HCPCS: Performed by: SURGERY

## 2018-09-26 PROCEDURE — 85025 COMPLETE CBC W/AUTO DIFF WBC: CPT

## 2018-09-26 PROCEDURE — 94761 N-INVAS EAR/PLS OXIMETRY MLT: CPT

## 2018-09-26 PROCEDURE — 25000003 PHARM REV CODE 250: Performed by: SURGERY

## 2018-09-26 PROCEDURE — 27000221 HC OXYGEN, UP TO 24 HOURS

## 2018-09-26 PROCEDURE — 36600 WITHDRAWAL OF ARTERIAL BLOOD: CPT

## 2018-09-26 PROCEDURE — 94640 AIRWAY INHALATION TREATMENT: CPT

## 2018-09-26 PROCEDURE — 25000242 PHARM REV CODE 250 ALT 637 W/ HCPCS: Performed by: SURGERY

## 2018-09-26 PROCEDURE — 11000001 HC ACUTE MED/SURG PRIVATE ROOM

## 2018-09-26 PROCEDURE — 94760 N-INVAS EAR/PLS OXIMETRY 1: CPT

## 2018-09-26 PROCEDURE — 80053 COMPREHEN METABOLIC PANEL: CPT

## 2018-09-26 PROCEDURE — 36415 COLL VENOUS BLD VENIPUNCTURE: CPT

## 2018-09-26 PROCEDURE — 25000003 PHARM REV CODE 250: Performed by: NURSE PRACTITIONER

## 2018-09-26 PROCEDURE — 99232 SBSQ HOSP IP/OBS MODERATE 35: CPT | Mod: ,,, | Performed by: FAMILY MEDICINE

## 2018-09-26 RX ORDER — CETIRIZINE HYDROCHLORIDE 10 MG/1
10 TABLET ORAL NIGHTLY
Status: DISCONTINUED | OUTPATIENT
Start: 2018-09-26 | End: 2018-09-27 | Stop reason: HOSPADM

## 2018-09-26 RX ORDER — BUTALBITAL, ACETAMINOPHEN AND CAFFEINE 50; 325; 40 MG/1; MG/1; MG/1
1 TABLET ORAL EVERY 6 HOURS PRN
Status: DISCONTINUED | OUTPATIENT
Start: 2018-09-26 | End: 2018-09-27 | Stop reason: HOSPADM

## 2018-09-26 RX ORDER — ADHESIVE BANDAGE
30 BANDAGE TOPICAL 2 TIMES DAILY PRN
Status: DISCONTINUED | OUTPATIENT
Start: 2018-09-26 | End: 2018-09-27 | Stop reason: HOSPADM

## 2018-09-26 RX ORDER — ALPRAZOLAM 0.25 MG/1
0.25 TABLET ORAL 2 TIMES DAILY PRN
Status: DISCONTINUED | OUTPATIENT
Start: 2018-09-26 | End: 2018-09-27 | Stop reason: HOSPADM

## 2018-09-26 RX ADMIN — AZITHROMYCIN MONOHYDRATE 500 MG: 500 INJECTION, POWDER, LYOPHILIZED, FOR SOLUTION INTRAVENOUS at 06:09

## 2018-09-26 RX ADMIN — ALPRAZOLAM 0.25 MG: 0.25 TABLET ORAL at 12:09

## 2018-09-26 RX ADMIN — IPRATROPIUM BROMIDE AND ALBUTEROL SULFATE 3 ML: .5; 3 SOLUTION RESPIRATORY (INHALATION) at 07:09

## 2018-09-26 RX ADMIN — CEFTRIAXONE 1 G: 1 INJECTION, SOLUTION INTRAVENOUS at 05:09

## 2018-09-26 RX ADMIN — METHYLPREDNISOLONE SODIUM SUCCINATE 125 MG: 125 INJECTION, POWDER, FOR SOLUTION INTRAMUSCULAR; INTRAVENOUS at 05:09

## 2018-09-26 RX ADMIN — IPRATROPIUM BROMIDE AND ALBUTEROL SULFATE 3 ML: .5; 3 SOLUTION RESPIRATORY (INHALATION) at 03:09

## 2018-09-26 RX ADMIN — PANTOPRAZOLE SODIUM 40 MG: 40 TABLET, DELAYED RELEASE ORAL at 10:09

## 2018-09-26 RX ADMIN — METHYLPREDNISOLONE SODIUM SUCCINATE 125 MG: 125 INJECTION, POWDER, FOR SOLUTION INTRAMUSCULAR; INTRAVENOUS at 02:09

## 2018-09-26 RX ADMIN — METHYLPREDNISOLONE SODIUM SUCCINATE 125 MG: 125 INJECTION, POWDER, FOR SOLUTION INTRAMUSCULAR; INTRAVENOUS at 10:09

## 2018-09-26 RX ADMIN — ACETAMINOPHEN 650 MG: 325 TABLET ORAL at 02:09

## 2018-09-26 RX ADMIN — BUTALBITAL, ACETAMINOPHEN, AND CAFFEINE 1 TABLET: 50; 325; 40 TABLET ORAL at 10:09

## 2018-09-26 RX ADMIN — IPRATROPIUM BROMIDE AND ALBUTEROL SULFATE 3 ML: .5; 3 SOLUTION RESPIRATORY (INHALATION) at 11:09

## 2018-09-26 RX ADMIN — IPRATROPIUM BROMIDE AND ALBUTEROL SULFATE 3 ML: .5; 3 SOLUTION RESPIRATORY (INHALATION) at 04:09

## 2018-09-26 RX ADMIN — IPRATROPIUM BROMIDE AND ALBUTEROL SULFATE 3 ML: .5; 3 SOLUTION RESPIRATORY (INHALATION) at 12:09

## 2018-09-26 RX ADMIN — ALPRAZOLAM 0.25 MG: 0.25 TABLET ORAL at 10:09

## 2018-09-26 RX ADMIN — CETIRIZINE HYDROCHLORIDE 10 MG: 10 TABLET, FILM COATED ORAL at 08:09

## 2018-09-26 NOTE — PLAN OF CARE
Problem: Patient Care Overview  Goal: Plan of Care Review  Outcome: Ongoing (interventions implemented as appropriate)  Patient did not rest much throughout shift. Venti mask 40% in use. Sats at low 90s. Room air is 86-88. Coughing frequently. Tele sinus tach. IV antibiotics continued. Patient ambulating around room and daley per self. Free from falls or injury. Plan of care reviewed with patient.

## 2018-09-26 NOTE — PROGRESS NOTES
[facility] Medicine  Progress Note    Patient Name: Carmencita Bryson  MRN: [id number]  Patient Class: IP- Inpatient   Admission Date: 2018  Length of Stay: 1 days  Attending Physician: Maria Alejandra Weeks MD  Primary Care Provider: Jean Pierre Chappell MD        Subjective:     Principal Problem:Acute hypoxemic respiratory failure    HPI:  18 Carmencita Bryson is a 38 y.o. female presented to ER last PM  with wheezing with an oxygen saturation of 88%  Patient had wheezing all fields with diminished air exchange, no asthma history  Patient has been having a dry hacking cough for the last month, worse last week  Patient had no fever, no history of hypoxia, has never required oxygen in the past  IV steroids given the ER has helped, patients there with a 90% room air oxygen;  She was given IV Rocephin, zithromax and Solumedrol 125mg IV x 1    feeling a lot  better this am; states she has not smoked in 4 days. Cough is not as bad.             Hospital Course:  18 C/o headache overnight; wanting something stronger; still with oxygen desaturation without O2 or with activity.   Sinus tach with activity; lots of coughing overnight  Takes xanax 0.25mg bid at home; requesting rx  WBC ^ 17.50; still receiving IV steroids; will taper   Notes she is feeling a lot better and Sob much better this am.   She reports hx of pneumonia last year; 2017 CT of chest is noted in chart.;reviewed;  Mild infiltrate within the left lower lobe  Probable benign 3 mm left lower lobe nodule  Prior granulomatous disease  Flu was negative this admit. 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 Denies any known exposure to TB; no hx of incarceration       Past Medical History:   Diagnosis Date    Anxiety        Past Surgical History:   Procedure Laterality Date    APPENDECTOMY      APPENDECTOMY-LAPAROSCOPIC N/A 2017    Performed by [facility]uer [facility] at [facility] OR     SECTION       CHOLECYSTECTOMY         Review of patient's allergies indicates:   Allergen Reactions    Tramadol Rash       No current facility-administered medications on file prior to encounter.      Current Outpatient Medications on File Prior to Encounter   Medication Sig    diazePAM (VALIUM) 5 MG tablet Take 5 mg by mouth every 6 (six) hours as needed for Anxiety.    albuterol 90 mcg/actuation inhaler Inhale 1-2 puffs into the lungs every 6 (six) hours as needed for Wheezing. Rescue    amoxicillin-clavulanate 875-125mg (AUGMENTIN) 875-125 mg per tablet Take 1 tablet by mouth 2 (two) times daily.    cetirizine (ZYRTEC) 10 MG tablet Take 10 mg by mouth once daily.    ciclopirox (LOPROX) 0.77 % Crea Apply topically 2 (two) times daily.    ketorolac (TORADOL) 10 mg tablet Take 1 tablet (10 mg total) by mouth every 6 (six) hours as needed for Pain.    methylPREDNISolone (MEDROL DOSEPACK) 4 mg tablet Pack as directed    ondansetron (ZOFRAN-ODT) 4 MG TbDL Take 1 tablet (4 mg total) by mouth every 8 (eight) hours as needed (nausea).    oxycodone-acetaminophen (PERCOCET)  mg per tablet Take 1 tablet by mouth every 4 (four) hours as needed for Pain.    triamcinolone acetonide 0.1% (KENALOG) 0.1 % cream Apply topically 2 (two) times daily. Apply to each ear canal bid     Family History     Problem Relation (Age of Onset)    Hypertension Father        Tobacco Use    Smoking status: Current Every Day Smoker     Packs/day: 1.00     Years: 1.00     Pack years: 1.00     Types: Cigarettes     Start date: 9/25/2017    Smokeless tobacco: Never Used   Substance and Sexual Activity    Alcohol use: Yes     Comment: occ    Drug use: No    Sexual activity: Yes     Partners: Male     Review of Systems   Constitutional: Negative for chills, fatigue and fever.   HENT: Negative for congestion, ear pain, postnasal drip, sinus pressure, sneezing and sore throat.    Eyes: Negative for discharge.   Respiratory: Positive for cough,  chest tightness, shortness of breath and wheezing.    Cardiovascular: Negative.    Gastrointestinal: Negative for abdominal pain, constipation, diarrhea, nausea and vomiting.   Genitourinary: Negative for difficulty urinating, flank pain and hematuria.   Musculoskeletal: Negative.  Negative for arthralgias and joint swelling.   Skin: Negative.    Neurological: Positive for headaches. Negative for dizziness.   Psychiatric/Behavioral: Positive for sleep disturbance. Negative for behavioral problems and confusion. The patient is nervous/anxious.      Objective:     Vital Signs (Most Recent):  Temp: 97.1 °F (36.2 °C) (09/26/18 0326)  Pulse: 85 (09/26/18 0545)  Resp: 18 (09/26/18 0326)  BP: 134/61 (09/26/18 0326)  SpO2: (!) 90 % (09/26/18 0326) Vital Signs (24h Range):  Temp:  [96.3 °F (35.7 °C)-97.3 °F (36.3 °C)] 97.1 °F (36.2 °C)  Pulse:  [] 85  Resp:  [16-26] 18  SpO2:  [87 %-94 %] 90 %  BP: (128-178)/(60-80) 134/61     Weight: 136.1 kg (300 lb)  Body mass index is 46.99 kg/m².    Physical Exam   Constitutional: She is oriented to person, place, and time. She appears well-developed. No distress.   obese   HENT:   Head: Normocephalic and atraumatic.   Eyes: Conjunctivae are normal. Pupils are equal, round, and reactive to light.   Neck: Normal range of motion. Neck supple. No thyromegaly present.   Cardiovascular: Normal rate, regular rhythm, normal heart sounds and intact distal pulses.   Pulmonary/Chest: Effort normal. No respiratory distress. She has wheezes (greater wheezing at apex bilaterally, end exp wheeze at bases, no rhonchi, harsh cough).   Abdominal: Soft. Bowel sounds are normal. There is no tenderness.   Musculoskeletal: Normal range of motion. She exhibits no edema.   Lymphadenopathy:     She has no cervical adenopathy.   Neurological: She is alert and oriented to person, place, and time.   Skin: Skin is warm and dry. No rash noted.   Psychiatric: She has a normal mood and affect. Her behavior is  normal.   Nursing note and vitals reviewed.        CRANIAL NERVES     CN III, IV, VI   Pupils are equal, round, and reactive to light.       Significant Labs:     Recent Labs   Lab  09/25/18   0110   PH  7.45   PCO2  37   HCO3  25.70*   POCSATURATED  92.9   BE  1.80   TOTALHB  13.1   COHB  0.6   METHB  0.5     Blood Culture: No results for input(s): LABBLOO in the last 48 hours.  CBC:   Recent Labs   Lab  09/25/18 0109 09/26/18   0548   WBC  15.66*  17.50*   HGB  12.8  12.9   HCT  38.0  39.2   PLT  286  315     CMP:   Recent Labs   Lab  09/25/18 0109   NA  139   K  4.0   CL  106   CO2  21*   GLU  111*   BUN  11   CREATININE  0.8   CALCIUM  9.9   PROT  7.2   ALBUMIN  3.9   BILITOT  1.2*   ALKPHOS  81   AST  46*   ALT  41   ANIONGAP  12   EGFRNONAA  >60   Troponin:   Recent Labs   Lab  09/25/18 0109   TROPONINI  <0.006       Significant Imaging: see below   CXR :No acute abnormality    Assessment/Plan:      * Acute hypoxemic respiratory failure    O2   Steroids  Nebs  IV antibiotics   9/26 this am O2 sat 90%-93% on RA; still tight           Anxiety    Takes xanax 0.25mg po twice daily at home NOT Valium  Has oxycodone listed but notes she has been out of for months ; for back pain          Constipation    MOM          Smoker      Nicotine patch   long discussion with pt regarding risks of smoking and long term damage  Refer to Smoking cessation as OP         Acute bronchitis due to other specified organisms    o2- sat 88% on RA  Neb tx  Continue IV steroids  ABX rocephin + azithromycin  Possible d/c tomorrow if wheezing and sat better          Hypoxia    Looks like copd exacerbation  Continue oxygen  Neb tx, iv steroid           Chronic headache    Tylenol and ibuprofen  fioricet prn            VTE Risk Mitigation (From admission, onward)        Ordered     IP VTE HIGH RISK PATIENT  Once      09/25/18 0739     Place sequential compression device  Until discontinued      09/25/18 0739              Cecilia JACOME  MD Hermilo  Department of Hospital Medicine   [facility]

## 2018-09-26 NOTE — ASSESSMENT & PLAN NOTE
Takes xanax 0.25mg po twice daily at home NOT Valium  Has oxycodone listed but notes she has been out of for months ; for back pain

## 2018-09-26 NOTE — PLAN OF CARE
Problem: Patient Care Overview  Goal: Plan of Care Review  Outcome: Ongoing (interventions implemented as appropriate)  Pt is still coughing a lot and she is still wheezing but not as bad as yesterday when she was admitted. Oxygen is still required or her SPO2 drops in the mid to upper 80s

## 2018-09-26 NOTE — HOSPITAL COURSE
9/26/18 C/o headache overnight; wanting something stronger; still with oxygen desaturation without O2 or with activity.   Sinus tach with activity; lots of coughing overnight  Takes xanax 0.25mg bid at home; requesting rx  WBC ^ 17.50; still receiving IV steroids; will taper   Notes she is feeling a lot better and Sob much better this am.   She reports hx of pneumonia last year; 6/2017 CT of chest is noted in chart.;reviewed;  Mild infiltrate within the left lower lobe  Probable benign 3 mm left lower lobe nodule  Prior granulomatous disease  Flu was negative this admit. 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 Denies any known exposure to TB; no hx of incarceration     9/27/18 94% on RA; feeling a lot better; no fever   WBC 17.50 yesterday but getting steroids

## 2018-09-26 NOTE — PLAN OF CARE
09/26/18 1331   Discharge Reassessment   Assessment Type Discharge Planning Reassessment       Patient is still wheezing, and will stay another day. MD informed her of lung status. Patient will stay on IV steroids, to be tapered, and IV antibiotics for today and MD will re-adress in the AM. Patient voices understanding. CM will continue to follow and assist as needed.

## 2018-09-26 NOTE — ASSESSMENT & PLAN NOTE
o2- sat 88% on RA  Neb tx  Continue IV steroids  ABX rocephin + azithromycin  Possible d/c tomorrow if wheezing and sat better

## 2018-09-26 NOTE — PROGRESS NOTES
Staff Handoff  Bedside report per BONITA Orellana. 40% Venti mask in use. Tele sinus tach. No distress noted. Patient resting. Call bell in reach.     Resident Handoff

## 2018-09-26 NOTE — SUBJECTIVE & OBJECTIVE
Past Medical History:   Diagnosis Date    Anxiety        Past Surgical History:   Procedure Laterality Date    APPENDECTOMY      APPENDECTOMY-LAPAROSCOPIC N/A 2017    Performed by [facility]uer [facility] at [facility] OR     SECTION      CHOLECYSTECTOMY         Review of patient's allergies indicates:   Allergen Reactions    Tramadol Rash       No current facility-administered medications on file prior to encounter.      Current Outpatient Medications on File Prior to Encounter   Medication Sig    diazePAM (VALIUM) 5 MG tablet Take 5 mg by mouth every 6 (six) hours as needed for Anxiety.    albuterol 90 mcg/actuation inhaler Inhale 1-2 puffs into the lungs every 6 (six) hours as needed for Wheezing. Rescue    amoxicillin-clavulanate 875-125mg (AUGMENTIN) 875-125 mg per tablet Take 1 tablet by mouth 2 (two) times daily.    cetirizine (ZYRTEC) 10 MG tablet Take 10 mg by mouth once daily.    ciclopirox (LOPROX) 0.77 % Crea Apply topically 2 (two) times daily.    ketorolac (TORADOL) 10 mg tablet Take 1 tablet (10 mg total) by mouth every 6 (six) hours as needed for Pain.    methylPREDNISolone (MEDROL DOSEPACK) 4 mg tablet Pack as directed    ondansetron (ZOFRAN-ODT) 4 MG TbDL Take 1 tablet (4 mg total) by mouth every 8 (eight) hours as needed (nausea).    oxycodone-acetaminophen (PERCOCET)  mg per tablet Take 1 tablet by mouth every 4 (four) hours as needed for Pain.    triamcinolone acetonide 0.1% (KENALOG) 0.1 % cream Apply topically 2 (two) times daily. Apply to each ear canal bid     Family History     Problem Relation (Age of Onset)    Hypertension Father        Tobacco Use    Smoking status: Current Every Day Smoker     Packs/day: 1.00     Years: 1.00     Pack years: 1.00     Types: Cigarettes     Start date: 2017    Smokeless tobacco: Never Used   Substance and Sexual Activity    Alcohol use: Yes     Comment: occ    Drug use: No    Sexual activity: Yes     Partners: Male      Review of Systems   Constitutional: Negative for chills, fatigue and fever.   HENT: Negative for congestion, ear pain, postnasal drip, sinus pressure, sneezing and sore throat.    Eyes: Negative for discharge.   Respiratory: Positive for cough, chest tightness, shortness of breath and wheezing.    Cardiovascular: Negative.    Gastrointestinal: Negative for abdominal pain, constipation, diarrhea, nausea and vomiting.   Genitourinary: Negative for difficulty urinating, flank pain and hematuria.   Musculoskeletal: Negative.  Negative for arthralgias and joint swelling.   Skin: Negative.    Neurological: Positive for headaches. Negative for dizziness.   Psychiatric/Behavioral: Positive for sleep disturbance. Negative for behavioral problems and confusion. The patient is nervous/anxious.      Objective:     Vital Signs (Most Recent):  Temp: 97.1 °F (36.2 °C) (09/26/18 0326)  Pulse: 85 (09/26/18 0545)  Resp: 18 (09/26/18 0326)  BP: 134/61 (09/26/18 0326)  SpO2: (!) 90 % (09/26/18 0326) Vital Signs (24h Range):  Temp:  [96.3 °F (35.7 °C)-97.3 °F (36.3 °C)] 97.1 °F (36.2 °C)  Pulse:  [] 85  Resp:  [16-26] 18  SpO2:  [87 %-94 %] 90 %  BP: (128-178)/(60-80) 134/61     Weight: 136.1 kg (300 lb)  Body mass index is 46.99 kg/m².    Physical Exam   Constitutional: She is oriented to person, place, and time. She appears well-developed. No distress.   obese   HENT:   Head: Normocephalic and atraumatic.   Eyes: Conjunctivae are normal. Pupils are equal, round, and reactive to light.   Neck: Normal range of motion. Neck supple. No thyromegaly present.   Cardiovascular: Normal rate, regular rhythm, normal heart sounds and intact distal pulses.   Pulmonary/Chest: Effort normal. No respiratory distress. She has wheezes (greater wheezing at apex bilaterally, end exp wheeze at bases, no rhonchi, harsh cough).   Abdominal: Soft. Bowel sounds are normal. There is no tenderness.   Musculoskeletal: Normal range of motion. She  exhibits no edema.   Lymphadenopathy:     She has no cervical adenopathy.   Neurological: She is alert and oriented to person, place, and time.   Skin: Skin is warm and dry. No rash noted.   Psychiatric: She has a normal mood and affect. Her behavior is normal.   Nursing note and vitals reviewed.        CRANIAL NERVES     CN III, IV, VI   Pupils are equal, round, and reactive to light.       Significant Labs:     Recent Labs   Lab  09/25/18   0110   PH  7.45   PCO2  37   HCO3  25.70*   POCSATURATED  92.9   BE  1.80   TOTALHB  13.1   COHB  0.6   METHB  0.5     Blood Culture: No results for input(s): LABBLOO in the last 48 hours.  CBC:   Recent Labs   Lab  09/25/18   0109 09/26/18   0548   WBC  15.66*  17.50*   HGB  12.8  12.9   HCT  38.0  39.2   PLT  286  315     CMP:   Recent Labs   Lab  09/25/18 0109   NA  139   K  4.0   CL  106   CO2  21*   GLU  111*   BUN  11   CREATININE  0.8   CALCIUM  9.9   PROT  7.2   ALBUMIN  3.9   BILITOT  1.2*   ALKPHOS  81   AST  46*   ALT  41   ANIONGAP  12   EGFRNONAA  >60   Troponin:   Recent Labs   Lab  09/25/18 0109   TROPONINI  <0.006       Significant Imaging: see below   CXR :No acute abnormality

## 2018-09-27 VITALS
WEIGHT: 293 LBS | HEIGHT: 67 IN | HEART RATE: 89 BPM | SYSTOLIC BLOOD PRESSURE: 142 MMHG | RESPIRATION RATE: 14 BRPM | OXYGEN SATURATION: 92 % | TEMPERATURE: 98 F | DIASTOLIC BLOOD PRESSURE: 72 MMHG | BODY MASS INDEX: 45.99 KG/M2

## 2018-09-27 PROCEDURE — 63600175 PHARM REV CODE 636 W HCPCS: Performed by: SURGERY

## 2018-09-27 PROCEDURE — 25000242 PHARM REV CODE 250 ALT 637 W/ HCPCS: Performed by: SURGERY

## 2018-09-27 PROCEDURE — 27000221 HC OXYGEN, UP TO 24 HOURS

## 2018-09-27 PROCEDURE — 25000003 PHARM REV CODE 250: Performed by: NURSE PRACTITIONER

## 2018-09-27 PROCEDURE — 94640 AIRWAY INHALATION TREATMENT: CPT

## 2018-09-27 PROCEDURE — 25000003 PHARM REV CODE 250: Performed by: SURGERY

## 2018-09-27 RX ORDER — PREDNISONE 20 MG/1
TABLET ORAL
Qty: 26 TABLET | Refills: 0 | Status: ON HOLD | OUTPATIENT
Start: 2018-09-27 | End: 2018-11-17 | Stop reason: HOSPADM

## 2018-09-27 RX ORDER — IPRATROPIUM BROMIDE AND ALBUTEROL SULFATE 2.5; .5 MG/3ML; MG/3ML
3 SOLUTION RESPIRATORY (INHALATION)
Qty: 90 ML | Refills: 0 | Status: SHIPPED | OUTPATIENT
Start: 2018-09-27 | End: 2020-02-16

## 2018-09-27 RX ORDER — BUDESONIDE AND FORMOTEROL FUMARATE DIHYDRATE 160; 4.5 UG/1; UG/1
2 AEROSOL RESPIRATORY (INHALATION) EVERY 12 HOURS
Qty: 10.2 G | Refills: 0 | Status: SHIPPED | OUTPATIENT
Start: 2018-09-27 | End: 2018-10-28 | Stop reason: SDUPTHER

## 2018-09-27 RX ADMIN — BUTALBITAL, ACETAMINOPHEN, AND CAFFEINE 1 TABLET: 50; 325; 40 TABLET ORAL at 08:09

## 2018-09-27 RX ADMIN — AZITHROMYCIN MONOHYDRATE 500 MG: 500 INJECTION, POWDER, LYOPHILIZED, FOR SOLUTION INTRAVENOUS at 07:09

## 2018-09-27 RX ADMIN — IPRATROPIUM BROMIDE AND ALBUTEROL SULFATE 3 ML: .5; 3 SOLUTION RESPIRATORY (INHALATION) at 07:09

## 2018-09-27 RX ADMIN — CEFTRIAXONE 1 G: 1 INJECTION, SOLUTION INTRAVENOUS at 06:09

## 2018-09-27 RX ADMIN — METHYLPREDNISOLONE SODIUM SUCCINATE 125 MG: 125 INJECTION, POWDER, FOR SOLUTION INTRAMUSCULAR; INTRAVENOUS at 06:09

## 2018-09-27 RX ADMIN — IPRATROPIUM BROMIDE AND ALBUTEROL SULFATE 3 ML: .5; 3 SOLUTION RESPIRATORY (INHALATION) at 03:09

## 2018-09-27 NOTE — ASSESSMENT & PLAN NOTE
O2   Steroids  Nebs  IV antibiotics   9/26 this am O2 sat 90%-93% on RA; still tight   9/27 O2 Sat 94%; f/u as OP with Dr. Chappell

## 2018-09-27 NOTE — SUBJECTIVE & OBJECTIVE
Past Medical History:   Diagnosis Date    Anxiety        Past Surgical History:   Procedure Laterality Date    APPENDECTOMY      APPENDECTOMY-LAPAROSCOPIC N/A 2017    Performed by [facility]uer [facility] at [facility] OR     SECTION      CHOLECYSTECTOMY         Review of patient's allergies indicates:   Allergen Reactions    Tramadol Rash       No current facility-administered medications on file prior to encounter.      Current Outpatient Medications on File Prior to Encounter   Medication Sig    diazePAM (VALIUM) 5 MG tablet Take 5 mg by mouth every 6 (six) hours as needed for Anxiety.    albuterol 90 mcg/actuation inhaler Inhale 1-2 puffs into the lungs every 6 (six) hours as needed for Wheezing. Rescue    amoxicillin-clavulanate 875-125mg (AUGMENTIN) 875-125 mg per tablet Take 1 tablet by mouth 2 (two) times daily.    cetirizine (ZYRTEC) 10 MG tablet Take 10 mg by mouth once daily.    ciclopirox (LOPROX) 0.77 % Crea Apply topically 2 (two) times daily.    ketorolac (TORADOL) 10 mg tablet Take 1 tablet (10 mg total) by mouth every 6 (six) hours as needed for Pain.    methylPREDNISolone (MEDROL DOSEPACK) 4 mg tablet Pack as directed    ondansetron (ZOFRAN-ODT) 4 MG TbDL Take 1 tablet (4 mg total) by mouth every 8 (eight) hours as needed (nausea).    oxycodone-acetaminophen (PERCOCET)  mg per tablet Take 1 tablet by mouth every 4 (four) hours as needed for Pain.    triamcinolone acetonide 0.1% (KENALOG) 0.1 % cream Apply topically 2 (two) times daily. Apply to each ear canal bid     Family History     Problem Relation (Age of Onset)    Hypertension Father        Tobacco Use    Smoking status: Current Every Day Smoker     Packs/day: 1.00     Years: 1.00     Pack years: 1.00     Types: Cigarettes     Start date: 2017    Smokeless tobacco: Never Used   Substance and Sexual Activity    Alcohol use: Yes     Comment: occ    Drug use: No    Sexual activity: Yes     Partners: Male      Review of Systems   Constitutional: Negative for chills, fatigue and fever.   HENT: Negative for congestion, ear pain, postnasal drip, sinus pressure, sneezing and sore throat.    Eyes: Negative for discharge.   Respiratory: Positive for cough, chest tightness, shortness of breath and wheezing.    Cardiovascular: Negative.    Gastrointestinal: Negative for abdominal pain, constipation, diarrhea, nausea and vomiting.   Genitourinary: Negative for difficulty urinating, flank pain and hematuria.   Musculoskeletal: Negative.  Negative for arthralgias and joint swelling.   Skin: Negative.    Neurological: Positive for headaches. Negative for dizziness.   Psychiatric/Behavioral: Positive for sleep disturbance. Negative for behavioral problems and confusion. The patient is nervous/anxious.      Objective:     Vital Signs (Most Recent):  Temp: 96.7 °F (35.9 °C) (09/27/18 0735)  Pulse: (!) 112 (09/27/18 0800)  Resp: 14 (09/27/18 0743)  BP: (!) 109/54 (09/27/18 0735)  SpO2: 98 % (09/27/18 0743) Vital Signs (24h Range):  Temp:  [96.3 °F (35.7 °C)-97.8 °F (36.6 °C)] 96.7 °F (35.9 °C)  Pulse:  [] 112  Resp:  [14-18] 14  SpO2:  [92 %-98 %] 98 %  BP: (109-152)/(54-72) 109/54     Weight: 136.1 kg (300 lb)  Body mass index is 46.99 kg/m².    Physical Exam   Constitutional: She is oriented to person, place, and time. She appears well-developed. No distress.   obese   HENT:   Head: Normocephalic and atraumatic.   Eyes: Conjunctivae are normal. Pupils are equal, round, and reactive to light.   Neck: Normal range of motion. Neck supple. No thyromegaly present.   Cardiovascular: Normal rate, regular rhythm, normal heart sounds and intact distal pulses.   Pulmonary/Chest: Effort normal. No respiratory distress. She has wheezes (greater wheezing at apex bilaterally, end exp wheeze at bases, no rhonchi, harsh cough).   Abdominal: Soft. Bowel sounds are normal. There is no tenderness.   Musculoskeletal: Normal range of motion. She  exhibits no edema.   Lymphadenopathy:     She has no cervical adenopathy.   Neurological: She is alert and oriented to person, place, and time.   Skin: Skin is warm and dry. No rash noted.   Psychiatric: She has a normal mood and affect. Her behavior is normal.   Nursing note and vitals reviewed.        CRANIAL NERVES     CN III, IV, VI   Pupils are equal, round, and reactive to light.       Significant Labs:     No results for input(s): PH, PCO2, HCO3, POCSATURATED, BE, TOTALHB, COHB, METHB, O2HB, POCFIO2 in the last 48 hours.  Blood Culture: No results for input(s): LABBLOO in the last 48 hours.  CBC:   Recent Labs   Lab  09/26/18   0548   WBC  17.50*   HGB  12.9   HCT  39.2   PLT  315     CMP:   Recent Labs   Lab  09/26/18   0548   NA  140   K  4.5   CL  106   CO2  22*   GLU  228*   BUN  11   CREATININE  0.8   CALCIUM  10.0   PROT  7.5   ALBUMIN  3.9   BILITOT  0.7   ALKPHOS  82   AST  25   ALT  39   ANIONGAP  12   EGFRNONAA  >60   Troponin:   No results for input(s): TROPONINI in the last 48 hours.    Significant Imaging: see below   CXR :No acute abnormality

## 2018-09-27 NOTE — DISCHARGE SUMMARY
[facility] Medicine  Discharge Summary      Patient Name: Carmencita Bryson  MRN: [id number]  Admission Date: 9/25/2018  Hospital Length of Stay: 2 days  Discharge Date and Time:  09/27/2018 11:12 AM  Attending Physician: Maria Alejandra Weeks MD   Discharging Provider: Chiaar Campa NP  Primary Care Provider: Jean Pierre Chappell MD      HPI:   9/25/18 Carmencita Bryson is a 38 y.o. female presented to ER last PM  with wheezing with an oxygen saturation of 88%  Patient had wheezing all fields with diminished air exchange, no asthma history  Patient has been having a dry hacking cough for the last month, worse last week  Patient had no fever, no history of hypoxia, has never required oxygen in the past  IV steroids given the ER has helped, patients there with a 90% room air oxygen;  She was given IV Rocephin, zithromax and Solumedrol 125mg IV x 1   9/25 feeling a lot  better this am; states she has not smoked in 4 days. Cough is not as bad.             * No surgery found *      Hospital Course:   9/26/18 C/o headache overnight; wanting something stronger; still with oxygen desaturation without O2 or with activity.   Sinus tach with activity; lots of coughing overnight  Takes xanax 0.25mg bid at home; requesting rx  WBC ^ 17.50; still receiving IV steroids; will taper   Notes she is feeling a lot better and Sob much better this am.   She reports hx of pneumonia last year; 6/2017 CT of chest is noted in chart.;reviewed;  Mild infiltrate within the left lower lobe  Probable benign 3 mm left lower lobe nodule  Prior granulomatous disease  Flu was negative this admit. 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 Denies any known exposure to TB; no hx of incarceration     9/27/18 94% on RA; feeling a lot better; no fever   WBC 17.50 yesterday but getting steroids      Consults:   Consults (From admission, onward)        Status Ordering Provider     IP consult to case management  Once      Provider:  (Not yet assigned)    Completed CHARLES GENO          * Acute hypoxemic respiratory failure    O2   Steroids  Nebs  IV antibiotics   9/26 this am O2 sat 90%-93% on RA; still tight   9/27 O2 Sat 94%; f/u as OP with Dr. Chappell         Anxiety    Takes xanax 0.25mg po twice daily at home NOT Valium  Has oxycodone listed but notes she has been out of for months ; for back pain          Constipation    MOM- did have BM yesterday           Smoker      Nicotine patch   long discussion with pt regarding risks of smoking and long term damage  Refer to Smoking cessation as OP         Acute bronchitis due to other specified organisms    o2- sat 88% on RA  Neb tx  Continue IV steroids  ABX rocephin + azithromycin  Possible d/c tomorrow if wheezing and sat better  9/27 DUONEB for outp- pt has home Nebulizer;   Has had 3 days of zithromax; can stop;  Wean outpt - oral 60/40/20 - 4 days each   Pt requesting symbicort- will give 1m           Hypoxia    Looks like copd exacerbation  Continue oxygen  Neb tx, iv steroid   9/27 better- recommended OP PFTs rule out asthma           Chronic headache    Tylenol and ibuprofen  fioricet prn            Final Active Diagnoses:    Diagnosis Date Noted POA    PRINCIPAL PROBLEM:  Acute hypoxemic respiratory failure [J96.01] 09/25/2018 Yes    Constipation [K59.00] 09/26/2018 Yes    Anxiety [F41.9] 09/26/2018 Yes    Hypoxia [R09.02] 09/25/2018 Yes    Acute bronchitis due to other specified organisms [J20.8] 09/25/2018 Yes    Smoker [F17.200] 09/25/2018 Yes    Chronic headache [R51] 01/07/2014 Yes      Problems Resolved During this Admission:       Discharged Condition: good    Disposition: Home or Self Care    Follow Up:  Follow-up Information     Jean Pierre Chappell MD. Schedule an appointment as soon as possible for a visit in 1 week.    Specialty:  Internal Medicine  Why:  Outpatient Services  Contact information:  [address] [place] [id number] [phone]                  Patient Instructions:   No discharge procedures on file.    Significant Diagnostic Studies:    Recent Labs   Lab  09/26/18   0548   NA  140   K  4.5   CL  106   CO2  22*   GLU  228*   BUN  11   CREATININE  0.8   CALCIUM  10.0   PROT  7.5   ALBUMIN  3.9   BILITOT  0.7   ALKPHOS  82   AST  25   ALT  39   ANIONGAP  12   EGFRNONAA  >60     Pending Diagnostic Studies:     None         Medications:  Reconciled Home Medications:      Medication List      START taking these medications    albuterol-ipratropium 2.5 mg-0.5 mg/3 mL nebulizer solution  Commonly known as:  DUO-NEB  Take 3 mLs by nebulization every 6 (six) hours while awake rescue     budesonide-formoterol 160-4.5 mcg 160-4.5 mcg/actuation Hfaa  Commonly known as:  SYMBICORT  Inhale 2 puffs into the lungs every 12 (twelve) hours. Controller     predniSONE 20 MG tablet  Commonly known as:  DELTASONE  Take 3 tablets by mouth for 4 days then decrease to 2 tablets by mouth for 4 days then decrease to 1 tablet by mouth for 4 days then 1/2 tablet by mouth for 4 days        CONTINUE taking these medications    albuterol 90 mcg/actuation inhaler  Commonly known as:  PROVENTIL/VENTOLIN HFA  Inhale 1-2 puffs into the lungs every 6 (six) hours as needed for Wheezing. Rescue     cetirizine 10 MG tablet  Commonly known as:  ZYRTEC  Take 10 mg by mouth once daily.     ciclopirox 0.77 % Crea  Commonly known as:  LOPROX  Apply topically 2 (two) times daily.     ondansetron 4 MG Tbdl  Commonly known as:  ZOFRAN-ODT  Take 1 tablet (4 mg total) by mouth every 8 (eight) hours as needed (nausea).     triamcinolone acetonide 0.1% 0.1 % cream  Commonly known as:  KENALOG  Apply topically 2 (two) times daily. Apply to each ear canal bid        STOP taking these medications    amoxicillin-clavulanate 875-125mg 875-125 mg per tablet  Commonly known as:  AUGMENTIN     diazePAM 5 MG tablet  Commonly known as:  VALIUM     ketorolac 10 mg tablet  Commonly known as:  TORADOL      methylPREDNISolone 4 mg tablet  Commonly known as:  MEDROL DOSEPACK     oxyCODONE-acetaminophen  mg per tablet  Commonly known as:  PERCOCET            Indwelling Lines/Drains at time of discharge:   Lines/Drains/Airways          None          Time spent on the discharge of patient: 20 minutes  Patient was seen and examined on the date of discharge and determined to be suitable for discharge.         Chiara Campa NP  Department of Hospital Medicine  [facility]

## 2018-09-27 NOTE — PROGRESS NOTES
Staff Handoff  Bedside report per STEVE Amaya. No distress noted. 5 L nasal cannula. Tele normal sinus. Patient denies pain. Call bell in reach. Encouraged to call for assistance.     Resident Handoff

## 2018-09-27 NOTE — PLAN OF CARE
Problem: Patient Care Overview  Goal: Plan of Care Review  Outcome: Ongoing (interventions implemented as appropriate)  Patient rested well for majority of shift. Room air was tolerated for most of shift. Complained of SOB after 0333 breathing treatment. Sat 86-88 on room air. Venti mask 40% applied. Sats up to 90-95. Tele sinus rhythm/ sinus tach when ambulating or coughing. Neuro intact. Free from falls or injury. Plan of care reviewed with patient.

## 2018-09-27 NOTE — ASSESSMENT & PLAN NOTE
o2- sat 88% on RA  Neb tx  Continue IV steroids  ABX rocephin + azithromycin  Possible d/c tomorrow if wheezing and sat better  9/27 DUONEB for outp- pt has home Nebulizer;   Has had 3 days of zithromax; can stop;  Wean outpt - oral 60/40/20 - 4 days each   Pt requesting symbicort- will give 1m

## 2018-09-27 NOTE — PLAN OF CARE
09/27/18 1024   Discharge Assessment   Assessment Type Discharge Planning Reassessment     Patient will discharge home today. She has no concerns for post acute care with none identified.

## 2018-09-27 NOTE — PLAN OF CARE
Problem: Patient Care Overview  Goal: Plan of Care Review  Outcome: Outcome(s) achieved Date Met: 09/27/18  Patient vitals stable. Denies pain other than headache this morning. HA resolved with PO pain medication. Denies SOB. NSR on telemetry. Remains free from falls. Patient to be discharged to home/self care. Will follow up with MD outpatient. Discharge instructions given. She voiced understanding.

## 2018-09-27 NOTE — ASSESSMENT & PLAN NOTE
Looks like copd exacerbation  Continue oxygen  Neb tx, iv steroid   9/27 better- recommended OP PFTs rule out asthma

## 2018-09-28 ENCOUNTER — PATIENT OUTREACH (OUTPATIENT)
Dept: ADMINISTRATIVE | Facility: CLINIC | Age: 38
End: 2018-09-28

## 2018-09-28 NOTE — PLAN OF CARE
09/28/18 0843   Final Note   Assessment Type Final Discharge Note   Discharge Disposition Home   What phone number can be called within the next 1-3 days to see how you are doing after discharge? [phone]   Hospital Follow Up  Appt(s) scheduled? Yes   Discharge plans and expectations educations in teach back method with documentation complete? Yes   Right Care Referral Info   Post Acute Recommendation No Care

## 2018-09-28 NOTE — PROGRESS NOTES
Left Message - [phone] Not accepting calls  [phone] [facility]              C3 nurse attempted to contact patient. No answer. The following message was left for the patient to return the call:  Good afternoon I am a nurse calling on behalf of [facility] from the [facility].  This is a Transitional Care Call for Carmencita Bryson. When you have a moment please contact us at [phone] or [phone] Monday through Friday, between the hours of 8 am to 4 pm. We look forward to speaking with you. On behalf of [facility] have a nice day.    The patient does not have an [facility] PCP, C3 nurse with continued efforts to contact patient.

## 2018-10-01 NOTE — PATIENT INSTRUCTIONS

## 2018-10-30 RX ORDER — BUDESONIDE AND FORMOTEROL FUMARATE DIHYDRATE 160; 4.5 UG/1; UG/1
AEROSOL RESPIRATORY (INHALATION)
Qty: 10.2 INHALER | Refills: 5 | Status: SHIPPED | OUTPATIENT
Start: 2018-10-30

## 2018-11-15 ENCOUNTER — HOSPITAL ENCOUNTER (OUTPATIENT)
Facility: HOSPITAL | Age: 38
Discharge: HOME | End: 2018-11-17
Attending: SURGERY | Admitting: FAMILY MEDICINE
Payer: COMMERCIAL

## 2018-11-15 DIAGNOSIS — I25.10 CARDIOVASCULAR DISEASE: ICD-10-CM

## 2018-11-15 DIAGNOSIS — R09.02 HYPOXIA: ICD-10-CM

## 2018-11-15 DIAGNOSIS — J45.20 INTERMITTENT ASTHMA WITHOUT COMPLICATION, UNSPECIFIED ASTHMA SEVERITY: ICD-10-CM

## 2018-11-15 DIAGNOSIS — J20.8 ACUTE BRONCHITIS DUE TO OTHER SPECIFIED ORGANISMS: ICD-10-CM

## 2018-11-15 DIAGNOSIS — J40 BRONCHITIS: Primary | ICD-10-CM

## 2018-11-15 DIAGNOSIS — F17.200 SMOKER: ICD-10-CM

## 2018-11-15 DIAGNOSIS — R06.02 SOB (SHORTNESS OF BREATH): ICD-10-CM

## 2018-11-15 LAB
ALBUMIN SERPL BCP-MCNC: 4.1 G/DL
ALLENS TEST: ABNORMAL
ALP SERPL-CCNC: 69 U/L
ALT SERPL W/O P-5'-P-CCNC: 23 U/L
ANION GAP SERPL CALC-SCNC: 9 MMOL/L
APTT BLDCRRT: 25.9 SEC
AST SERPL-CCNC: 27 U/L
BASOPHILS # BLD AUTO: 0.05 K/UL
BASOPHILS NFR BLD: 0.5 %
BILIRUB SERPL-MCNC: 0.5 MG/DL
BNP SERPL-MCNC: 31 PG/ML
BUN SERPL-MCNC: 10 MG/DL
CALCIUM SERPL-MCNC: 9.5 MG/DL
CHLORIDE SERPL-SCNC: 107 MMOL/L
CK MB SERPL-MCNC: 1.8 NG/ML
CK MB SERPL-RTO: 1.4 %
CK SERPL-CCNC: 131 U/L
CK SERPL-CCNC: 131 U/L
CO2 SERPL-SCNC: 24 MMOL/L
CREAT SERPL-MCNC: 0.9 MG/DL
D DIMER PPP IA.FEU-MCNC: 0.21 MG/L FEU
DELSYS: ABNORMAL
DIFFERENTIAL METHOD: ABNORMAL
EOSINOPHIL # BLD AUTO: 0.1 K/UL
EOSINOPHIL NFR BLD: 0.9 %
ERYTHROCYTE [DISTWIDTH] IN BLOOD BY AUTOMATED COUNT: 14.6 %
EST. GFR  (AFRICAN AMERICAN): >60 ML/MIN/1.73 M^2
EST. GFR  (NON AFRICAN AMERICAN): >60 ML/MIN/1.73 M^2
GLUCOSE SERPL-MCNC: 134 MG/DL
HCO3 UR-SCNC: 25.2 MMOL/L (ref 22–26)
HCT VFR BLD AUTO: 39.4 %
HGB BLD-MCNC: 13.1 G/DL
INFLUENZA A, MOLECULAR: NEGATIVE
INFLUENZA B, MOLECULAR: NEGATIVE
INR PPP: 1
LACTATE SERPL-SCNC: 2.6 MMOL/L
LACTATE SERPL-SCNC: 2.6 MMOL/L
LYMPHOCYTES # BLD AUTO: 1.2 K/UL
LYMPHOCYTES NFR BLD: 11.7 %
MCH RBC QN AUTO: 27.3 PG
MCHC RBC AUTO-ENTMCNC: 33.2 G/DL
MCV RBC AUTO: 82 FL
MONOCYTES # BLD AUTO: 0.4 K/UL
MONOCYTES NFR BLD: 3.7 %
NEUTROPHILS # BLD AUTO: 8.6 K/UL
NEUTROPHILS NFR BLD: 83.2 %
PCO2 BLDA: 38 MMHG (ref 35–45)
PH SMN: 7.43 [PH] (ref 7.35–7.45)
PLATELET # BLD AUTO: 287 K/UL
PMV BLD AUTO: 11.3 FL
PO2 BLDA: 56 MMHG (ref 75–100)
POC BE: 1 MMOL/L (ref -2–2)
POC COHB: 1 % (ref 0–3)
POC METHB: 0.8 % (ref 0–1.5)
POC O2HB ARTERIAL: 89.9 % (ref 94–100)
POC SATURATED O2: 91.5 % (ref 90–100)
POC TCO2: 26.4 MMOL/L
POC THB: 12.7 G/DL (ref 12–18)
POTASSIUM SERPL-SCNC: 3.6 MMOL/L
PROT SERPL-MCNC: 7.3 G/DL
PROTHROMBIN TIME: 9.9 SEC
RBC # BLD AUTO: 4.8 M/UL
SITE: ABNORMAL
SODIUM SERPL-SCNC: 140 MMOL/L
SPECIMEN SOURCE: NORMAL
TROPONIN I SERPL DL<=0.01 NG/ML-MCNC: <0.006 NG/ML
WBC # BLD AUTO: 10.34 K/UL

## 2018-11-15 PROCEDURE — 85379 FIBRIN DEGRADATION QUANT: CPT

## 2018-11-15 PROCEDURE — 96372 THER/PROPH/DIAG INJ SC/IM: CPT | Mod: 59

## 2018-11-15 PROCEDURE — 36600 WITHDRAWAL OF ARTERIAL BLOOD: CPT

## 2018-11-15 PROCEDURE — 83880 ASSAY OF NATRIURETIC PEPTIDE: CPT

## 2018-11-15 PROCEDURE — 84484 ASSAY OF TROPONIN QUANT: CPT

## 2018-11-15 PROCEDURE — 82553 CREATINE MB FRACTION: CPT

## 2018-11-15 PROCEDURE — 82803 BLOOD GASES ANY COMBINATION: CPT | Performed by: SURGERY

## 2018-11-15 PROCEDURE — 96375 TX/PRO/DX INJ NEW DRUG ADDON: CPT

## 2018-11-15 PROCEDURE — 93005 ELECTROCARDIOGRAM TRACING: CPT

## 2018-11-15 PROCEDURE — 27000221 HC OXYGEN, UP TO 24 HOURS

## 2018-11-15 PROCEDURE — G0378 HOSPITAL OBSERVATION PER HR: HCPCS

## 2018-11-15 PROCEDURE — 85025 COMPLETE CBC W/AUTO DIFF WBC: CPT

## 2018-11-15 PROCEDURE — 25000242 PHARM REV CODE 250 ALT 637 W/ HCPCS: Performed by: SURGERY

## 2018-11-15 PROCEDURE — 94640 AIRWAY INHALATION TREATMENT: CPT

## 2018-11-15 PROCEDURE — 93010 ELECTROCARDIOGRAM REPORT: CPT | Mod: ,,, | Performed by: INTERNAL MEDICINE

## 2018-11-15 PROCEDURE — 25000003 PHARM REV CODE 250: Performed by: SURGERY

## 2018-11-15 PROCEDURE — 94761 N-INVAS EAR/PLS OXIMETRY MLT: CPT

## 2018-11-15 PROCEDURE — 99285 EMERGENCY DEPT VISIT HI MDM: CPT | Mod: 25

## 2018-11-15 PROCEDURE — 99407 BEHAV CHNG SMOKING > 10 MIN: CPT | Mod: ,,, | Performed by: NURSE PRACTITIONER

## 2018-11-15 PROCEDURE — 63600175 PHARM REV CODE 636 W HCPCS: Performed by: SURGERY

## 2018-11-15 PROCEDURE — 80053 COMPREHEN METABOLIC PANEL: CPT

## 2018-11-15 PROCEDURE — 87040 BLOOD CULTURE FOR BACTERIA: CPT | Mod: 59

## 2018-11-15 PROCEDURE — 82550 ASSAY OF CK (CPK): CPT

## 2018-11-15 PROCEDURE — 83605 ASSAY OF LACTIC ACID: CPT | Mod: 91

## 2018-11-15 PROCEDURE — 85730 THROMBOPLASTIN TIME PARTIAL: CPT

## 2018-11-15 PROCEDURE — 85610 PROTHROMBIN TIME: CPT

## 2018-11-15 PROCEDURE — 36415 COLL VENOUS BLD VENIPUNCTURE: CPT

## 2018-11-15 PROCEDURE — 96365 THER/PROPH/DIAG IV INF INIT: CPT

## 2018-11-15 PROCEDURE — 99900035 HC TECH TIME PER 15 MIN (STAT)

## 2018-11-15 PROCEDURE — 87502 INFLUENZA DNA AMP PROBE: CPT

## 2018-11-15 RX ORDER — ACETAMINOPHEN 325 MG/1
650 TABLET ORAL EVERY 8 HOURS PRN
Status: DISCONTINUED | OUTPATIENT
Start: 2018-11-15 | End: 2018-11-17 | Stop reason: HOSPADM

## 2018-11-15 RX ORDER — METHYLPREDNISOLONE 4 MG/1
TABLET ORAL
Qty: 1 PACKAGE | Refills: 0 | Status: SHIPPED | OUTPATIENT
Start: 2018-11-15 | End: 2020-12-19 | Stop reason: ALTCHOICE

## 2018-11-15 RX ORDER — LEVALBUTEROL 1.25 MG/.5ML
1.25 SOLUTION, CONCENTRATE RESPIRATORY (INHALATION) EVERY 6 HOURS PRN
Status: DISCONTINUED | OUTPATIENT
Start: 2018-11-15 | End: 2018-11-16

## 2018-11-15 RX ORDER — METHYLPREDNISOLONE SOD SUCC 125 MG
125 VIAL (EA) INJECTION
Status: COMPLETED | OUTPATIENT
Start: 2018-11-15 | End: 2018-11-15

## 2018-11-15 RX ORDER — PANTOPRAZOLE SODIUM 40 MG/1
40 TABLET, DELAYED RELEASE ORAL DAILY
Status: DISCONTINUED | OUTPATIENT
Start: 2018-11-16 | End: 2018-11-17 | Stop reason: HOSPADM

## 2018-11-15 RX ORDER — LEVOFLOXACIN 500 MG/1
500 TABLET, FILM COATED ORAL DAILY
Qty: 7 TABLET | Refills: 0 | Status: SHIPPED | OUTPATIENT
Start: 2018-11-15 | End: 2018-11-22

## 2018-11-15 RX ORDER — PROMETHAZINE HYDROCHLORIDE AND DEXTROMETHORPHAN HYDROBROMIDE 6.25; 15 MG/5ML; MG/5ML
5 SYRUP ORAL EVERY 6 HOURS PRN
Qty: 118 ML | Refills: 0 | Status: SHIPPED | OUTPATIENT
Start: 2018-11-15 | End: 2018-11-25

## 2018-11-15 RX ORDER — ALBUTEROL SULFATE 0.83 MG/ML
2.5 SOLUTION RESPIRATORY (INHALATION) EVERY 6 HOURS PRN
Qty: 1 BOX | Refills: 0 | Status: SHIPPED | OUTPATIENT
Start: 2018-11-15 | End: 2019-11-15

## 2018-11-15 RX ORDER — IPRATROPIUM BROMIDE AND ALBUTEROL SULFATE 2.5; .5 MG/3ML; MG/3ML
3 SOLUTION RESPIRATORY (INHALATION)
Status: COMPLETED | OUTPATIENT
Start: 2018-11-15 | End: 2018-11-15

## 2018-11-15 RX ORDER — ONDANSETRON 2 MG/ML
4 INJECTION INTRAMUSCULAR; INTRAVENOUS EVERY 8 HOURS PRN
Status: DISCONTINUED | OUTPATIENT
Start: 2018-11-15 | End: 2018-11-17 | Stop reason: HOSPADM

## 2018-11-15 RX ORDER — CEFTRIAXONE 1 G/1
1 INJECTION, POWDER, FOR SOLUTION INTRAMUSCULAR; INTRAVENOUS
Status: COMPLETED | OUTPATIENT
Start: 2018-11-15 | End: 2018-11-15

## 2018-11-15 RX ORDER — ALBUTEROL SULFATE 90 UG/1
1-2 AEROSOL, METERED RESPIRATORY (INHALATION) EVERY 6 HOURS PRN
Qty: 1 INHALER | Refills: 0 | Status: SHIPPED | OUTPATIENT
Start: 2018-11-15 | End: 2019-11-15

## 2018-11-15 RX ORDER — METHYLPREDNISOLONE SOD SUCC 125 MG
125 VIAL (EA) INJECTION EVERY 8 HOURS
Status: DISCONTINUED | OUTPATIENT
Start: 2018-11-15 | End: 2018-11-17 | Stop reason: HOSPADM

## 2018-11-15 RX ADMIN — CEFTRIAXONE SODIUM 1 G: 1 INJECTION, POWDER, FOR SOLUTION INTRAMUSCULAR; INTRAVENOUS at 06:11

## 2018-11-15 RX ADMIN — AZITHROMYCIN MONOHYDRATE 500 MG: 500 INJECTION, POWDER, LYOPHILIZED, FOR SOLUTION INTRAVENOUS at 07:11

## 2018-11-15 RX ADMIN — METHYLPREDNISOLONE SODIUM SUCCINATE 125 MG: 125 INJECTION, POWDER, FOR SOLUTION INTRAMUSCULAR; INTRAVENOUS at 06:11

## 2018-11-15 RX ADMIN — METHYLPREDNISOLONE SODIUM SUCCINATE 125 MG: 125 INJECTION, POWDER, FOR SOLUTION INTRAMUSCULAR; INTRAVENOUS at 07:11

## 2018-11-15 RX ADMIN — IPRATROPIUM BROMIDE AND ALBUTEROL SULFATE 3 ML: .5; 3 SOLUTION RESPIRATORY (INHALATION) at 07:11

## 2018-11-15 RX ADMIN — IPRATROPIUM BROMIDE AND ALBUTEROL SULFATE 3 ML: .5; 3 SOLUTION RESPIRATORY (INHALATION) at 05:11

## 2018-11-15 RX ADMIN — LEVALBUTEROL 1.25 MG: 1.25 SOLUTION, CONCENTRATE RESPIRATORY (INHALATION) at 09:11

## 2018-11-15 NOTE — ED TRIAGE NOTES
"Pt amb to ed with c/o of "productive cough and nasal congestion x2 days". Pt son with same symptoms. Pneumonia dx and inpatient a month ago. otc medication taken with no help. No acute distress noted at present.  "

## 2018-11-16 LAB
ALBUMIN SERPL BCP-MCNC: 4.1 G/DL
ALP SERPL-CCNC: 68 U/L
ALT SERPL W/O P-5'-P-CCNC: 34 U/L
ANION GAP SERPL CALC-SCNC: 10 MMOL/L
AST SERPL-CCNC: 39 U/L
BASOPHILS # BLD AUTO: 0.02 K/UL
BASOPHILS NFR BLD: 0.1 %
BILIRUB SERPL-MCNC: 0.7 MG/DL
BUN SERPL-MCNC: 12 MG/DL
CALCIUM SERPL-MCNC: 10 MG/DL
CHLORIDE SERPL-SCNC: 106 MMOL/L
CO2 SERPL-SCNC: 24 MMOL/L
CREAT SERPL-MCNC: 0.8 MG/DL
DIFFERENTIAL METHOD: ABNORMAL
EOSINOPHIL # BLD AUTO: 0 K/UL
EOSINOPHIL NFR BLD: 0.1 %
ERYTHROCYTE [DISTWIDTH] IN BLOOD BY AUTOMATED COUNT: 14.7 %
EST. GFR  (AFRICAN AMERICAN): >60 ML/MIN/1.73 M^2
EST. GFR  (NON AFRICAN AMERICAN): >60 ML/MIN/1.73 M^2
GLUCOSE SERPL-MCNC: 162 MG/DL
HCT VFR BLD AUTO: 39.1 %
HGB BLD-MCNC: 13.3 G/DL
LACTATE SERPL-SCNC: 3.3 MMOL/L
LYMPHOCYTES # BLD AUTO: 1.2 K/UL
LYMPHOCYTES NFR BLD: 6.2 %
MCH RBC QN AUTO: 27.8 PG
MCHC RBC AUTO-ENTMCNC: 34 G/DL
MCV RBC AUTO: 82 FL
MONOCYTES # BLD AUTO: 0.5 K/UL
MONOCYTES NFR BLD: 2.7 %
NEUTROPHILS # BLD AUTO: 18.1 K/UL
NEUTROPHILS NFR BLD: 90.9 %
PLATELET # BLD AUTO: 298 K/UL
PMV BLD AUTO: 10.9 FL
POTASSIUM SERPL-SCNC: 4.4 MMOL/L
PROT SERPL-MCNC: 7.4 G/DL
RBC # BLD AUTO: 4.79 M/UL
SODIUM SERPL-SCNC: 140 MMOL/L
WBC # BLD AUTO: 19.94 K/UL

## 2018-11-16 PROCEDURE — 25000242 PHARM REV CODE 250 ALT 637 W/ HCPCS: Performed by: SURGERY

## 2018-11-16 PROCEDURE — 99222 1ST HOSP IP/OBS MODERATE 55: CPT | Mod: ,,, | Performed by: FAMILY MEDICINE

## 2018-11-16 PROCEDURE — 25000003 PHARM REV CODE 250: Performed by: NURSE PRACTITIONER

## 2018-11-16 PROCEDURE — 83605 ASSAY OF LACTIC ACID: CPT

## 2018-11-16 PROCEDURE — 63600175 PHARM REV CODE 636 W HCPCS: Performed by: SURGERY

## 2018-11-16 PROCEDURE — 85025 COMPLETE CBC W/AUTO DIFF WBC: CPT

## 2018-11-16 PROCEDURE — 27000221 HC OXYGEN, UP TO 24 HOURS

## 2018-11-16 PROCEDURE — 80053 COMPREHEN METABOLIC PANEL: CPT

## 2018-11-16 PROCEDURE — 25000242 PHARM REV CODE 250 ALT 637 W/ HCPCS: Performed by: INTERNAL MEDICINE

## 2018-11-16 PROCEDURE — 99900035 HC TECH TIME PER 15 MIN (STAT)

## 2018-11-16 PROCEDURE — 93005 ELECTROCARDIOGRAM TRACING: CPT

## 2018-11-16 PROCEDURE — 36415 COLL VENOUS BLD VENIPUNCTURE: CPT

## 2018-11-16 PROCEDURE — 94761 N-INVAS EAR/PLS OXIMETRY MLT: CPT

## 2018-11-16 PROCEDURE — S4991 NICOTINE PATCH NONLEGEND: HCPCS | Performed by: NURSE PRACTITIONER

## 2018-11-16 PROCEDURE — 25000242 PHARM REV CODE 250 ALT 637 W/ HCPCS: Performed by: NURSE PRACTITIONER

## 2018-11-16 PROCEDURE — 94640 AIRWAY INHALATION TREATMENT: CPT

## 2018-11-16 PROCEDURE — G0378 HOSPITAL OBSERVATION PER HR: HCPCS

## 2018-11-16 PROCEDURE — 93010 ELECTROCARDIOGRAM REPORT: CPT | Mod: ,,, | Performed by: INTERNAL MEDICINE

## 2018-11-16 PROCEDURE — 96376 TX/PRO/DX INJ SAME DRUG ADON: CPT | Performed by: SURGERY

## 2018-11-16 PROCEDURE — 25000003 PHARM REV CODE 250: Performed by: SURGERY

## 2018-11-16 RX ORDER — IBUPROFEN 200 MG
1 TABLET ORAL DAILY PRN
Status: DISCONTINUED | OUTPATIENT
Start: 2018-11-16 | End: 2018-11-17 | Stop reason: HOSPADM

## 2018-11-16 RX ORDER — GUAIFENESIN/DEXTROMETHORPHAN 100-10MG/5
5 SYRUP ORAL EVERY 6 HOURS PRN
Status: DISCONTINUED | OUTPATIENT
Start: 2018-11-16 | End: 2018-11-17 | Stop reason: HOSPADM

## 2018-11-16 RX ORDER — DEXTROMETHORPHAN HBR. AND GUAIFENESIN 10; 100 MG/5ML; MG/5ML
5 SOLUTION ORAL EVERY 6 HOURS PRN
Status: DISCONTINUED | OUTPATIENT
Start: 2018-11-16 | End: 2018-11-16

## 2018-11-16 RX ORDER — IBUPROFEN 200 MG
1 TABLET ORAL DAILY
Status: DISCONTINUED | OUTPATIENT
Start: 2018-11-16 | End: 2018-11-16

## 2018-11-16 RX ORDER — FLUTICASONE FUROATE AND VILANTEROL 200; 25 UG/1; UG/1
1 POWDER RESPIRATORY (INHALATION) DAILY
Status: DISCONTINUED | OUTPATIENT
Start: 2018-11-16 | End: 2018-11-17 | Stop reason: HOSPADM

## 2018-11-16 RX ORDER — ALPRAZOLAM 0.25 MG/1
0.25 TABLET ORAL 2 TIMES DAILY
Status: DISCONTINUED | OUTPATIENT
Start: 2018-11-16 | End: 2018-11-17 | Stop reason: HOSPADM

## 2018-11-16 RX ORDER — IPRATROPIUM BROMIDE AND ALBUTEROL SULFATE 2.5; .5 MG/3ML; MG/3ML
3 SOLUTION RESPIRATORY (INHALATION) EVERY 4 HOURS
Status: DISCONTINUED | OUTPATIENT
Start: 2018-11-16 | End: 2018-11-17 | Stop reason: HOSPADM

## 2018-11-16 RX ADMIN — GUAIFENESIN AND DEXTROMETHORPHAN 5 ML: 100; 10 SYRUP ORAL at 09:11

## 2018-11-16 RX ADMIN — METHYLPREDNISOLONE SODIUM SUCCINATE 125 MG: 125 INJECTION, POWDER, FOR SOLUTION INTRAMUSCULAR; INTRAVENOUS at 05:11

## 2018-11-16 RX ADMIN — NICOTINE 1 PATCH: 14 PATCH, EXTENDED RELEASE TRANSDERMAL at 06:11

## 2018-11-16 RX ADMIN — FLUTICASONE FUROATE AND VILANTEROL TRIFENATATE 1 PUFF: 200; 25 POWDER RESPIRATORY (INHALATION) at 11:11

## 2018-11-16 RX ADMIN — ACETAMINOPHEN 650 MG: 325 TABLET ORAL at 08:11

## 2018-11-16 RX ADMIN — IPRATROPIUM BROMIDE AND ALBUTEROL SULFATE 3 ML: .5; 3 SOLUTION RESPIRATORY (INHALATION) at 03:11

## 2018-11-16 RX ADMIN — IPRATROPIUM BROMIDE AND ALBUTEROL SULFATE 3 ML: .5; 3 SOLUTION RESPIRATORY (INHALATION) at 11:11

## 2018-11-16 RX ADMIN — ALPRAZOLAM 0.25 MG: 0.25 TABLET ORAL at 09:11

## 2018-11-16 RX ADMIN — IPRATROPIUM BROMIDE AND ALBUTEROL SULFATE 3 ML: .5; 3 SOLUTION RESPIRATORY (INHALATION) at 07:11

## 2018-11-16 RX ADMIN — METHYLPREDNISOLONE SODIUM SUCCINATE 125 MG: 125 INJECTION, POWDER, FOR SOLUTION INTRAMUSCULAR; INTRAVENOUS at 09:11

## 2018-11-16 RX ADMIN — METHYLPREDNISOLONE SODIUM SUCCINATE 125 MG: 125 INJECTION, POWDER, FOR SOLUTION INTRAMUSCULAR; INTRAVENOUS at 02:11

## 2018-11-16 RX ADMIN — AZITHROMYCIN MONOHYDRATE 500 MG: 500 INJECTION, POWDER, LYOPHILIZED, FOR SOLUTION INTRAVENOUS at 06:11

## 2018-11-16 RX ADMIN — PANTOPRAZOLE SODIUM 40 MG: 40 TABLET, DELAYED RELEASE ORAL at 08:11

## 2018-11-16 RX ADMIN — LEVALBUTEROL 1.25 MG: 1.25 SOLUTION, CONCENTRATE RESPIRATORY (INHALATION) at 04:11

## 2018-11-16 NOTE — PROGRESS NOTES
Staff Handoff  Bedside report recieved from Rishi.  Assessment completed per flow sheet. Bed locked, lower and call bell in reach.  Educated to call if needed. No c/o pain    Resident Handoff

## 2018-11-16 NOTE — PROGRESS NOTES
Ms. Bryson stated she is aware of all medications being given, reports no pain and had no questions about her medications.  I reinforced the need to ask for assistance to prevent falls.    Cleo Fountain, PharmD

## 2018-11-16 NOTE — ASSESSMENT & PLAN NOTE
Supplemental O2 via nasal canula; titrate O2 saturation to >92%.    Solumedrol 125 mg IV q 8 hrs.   Pulmonary consultation.   Continue beta 2 agonist bronchodilator treatments.   Continue IV antibiotics; zithromax, rocephin  Check sputum GS and Cx.   Continue routine medications as before.   Daily peak flow  Add Breo

## 2018-11-16 NOTE — PROGRESS NOTES
Staff Handoff  Bedside report received from BONITA Muñoz. Afebrile. Oxygen saturation 90% per pulse ox. 2L O2 per NC in use, no distress noted. c/o headache. Assessment complete per flowsheet. Call bell in reach. Instructed to call for any needs. Will continue to monitor for any change in status.    Resident Handoff

## 2018-11-16 NOTE — ED NOTES
Pt admitted to room 302. Pt transferred via wheel chair by BONITA Vega in no distress. VSS. Report given to Toni.

## 2018-11-16 NOTE — ED NOTES
The patient is awake, alert and cooperative with a calm affect, patient is aware of environment. Airway is open and patent, respirations are spontaneous, normal respiratory effort and rate noted, skin warm and dry, full ROM in all extremities, appearance: no apparent distress noted, resting comfortably.  VSS, no change from previous assessment.  Bed in low, locked position, SR x1, HOB 30 degrees.  Pt able to change position independently. Will continue to monitor.

## 2018-11-16 NOTE — ED PROVIDER NOTES
[facility]                                                 Chief Complaint  38 y.o. female with Cough and Nasal Congestion    History of Present Illness  Carmencita Bryson presents to the emergency room with profuse wheezing tonight  Patient has long history respiratory issues for last 6 months, heavy smoking HX  Patient on exam has wheezing in all fields, 90% oxygenation on ER triage today  Patient has a dry hacking cough, was recently hospitalized in 2018  Patient was also then seen later that month with bronchitis, related to her smoking  Patient states she cannot catch her breath tonight, active wheezing on ER triage    The history is provided by the patient   device was not used during this ER visit  Medical history: Anxiety  Surgical history:  and cholecystectomy  ALLERGIES: Codeine and tramadol    Review of Systems and Physical Exam      Review of Systems  -- Constitution - no fever, denies fatigue, no weakness, no chills  -- Eyes - no tearing or redness, no visual disturbance  -- Ear, Nose - no tinnitus or earache, no nasal congestion or discharge  -- Mouth,Throat - no sore throat, no toothache, normal voice, normal swallowing  -- Respiratory - cough and congestion, shortness of breath, ALEJANDRA  -- Cardiovascular - denies chest pain, no palpitations, denies claudication  -- Gastrointestinal - denies abdominal pain, nausea, vomiting, or diarrhea  -- Genitourinary - no dysuria, no hematuria, no flank pain, no bladder pain  -- Musculoskeletal - denies back pain, negative for myalgias and arthralgias   -- Neurological - no headache, denies weakness or seizure; no LOC  -- Skin - denies pallor, rash, or changes in skin. no hives or welts noted    Vital Signs  Her oral temperature is 97.6 °F (36.4 °C).   Her blood pressure is 143/60 and her pulse is 109.   Her respiration is 21 and oxygen saturation is 94%.     Physical Exam  -- Nursing note and vitals  reviewed  -- Constitutional: Appears well-developed and well-nourished  -- Head: Atraumatic. Normocephalic. No obvious abnormality  -- Eyes: Pupils are equal and reactive to light. Normal conjunctiva and lids  -- Cardiac: Normal rate, regular rhythm and normal heart sounds  -- Pulmonary: faint rhonchi at the bilateral bases with active wheezing   -- Abdominal: Soft, no tenderness. Normal bowel sounds. Normal liver edge  -- Musculoskeletal: Normal range of motion, no effusions. Joints stable   -- Neurological: No focal deficits. Showed good interaction with staff  -- Skin: Warm and dry. No evidence of rash or cellulitis    Emergency Room Course      Lab Results     K 3.6      CO2 24   BUN 10   CREATININE 0.9    (H)   ALKPHOS 69   AST 27   ALT 23   BILITOT 0.5   ALBUMIN 4.1   PROT 7.3   WBC 10.34   HGB 13.1   HCT 39.4            CPKMB 1.8   TROPONINI <0.006   INR 1.0   BNP 31   DDIMER 0.21   LACTATE 2.6 (H)     Arterial blood gas   PH 7.43   PCO2 38   PO2 56*   HCO3 25.20   POCSATURATED 91.5   BE 1.00      EKG  -- The EKG findings today were without concerning findings from baseline    Radiology  -- Chest x-ray showed no infiltrate and showed no acute pathology    Medications Given  azithromycin 500 mg in dextrose 5 % 250 mL IVPB (ready to mix system) (0 mg Intravenous Stopped 11/15/18 2039)   methylPREDNISolone sodium succinate injection 125 mg (125 mg Intravenous Given 11/15/18 1922)   albuterol-ipratropium 2.5 mg-0.5 mg/3 mL nebulizer solution 3 mL (3 mLs Nebulization Given 11/15/18 1707)   methylPREDNISolone sodium succinate injection 125 mg (125 mg Intramuscular Given 11/15/18 1822)   cefTRIAXone injection 1 g (1 g Intramuscular Given 11/15/18 1822)   albuterol-ipratropium 2.5 mg-0.5 mg/3 mL nebulizer solution 3 mL (3 mLs Nebulization Given 11/15/18 1900)     Diagnosis  -- The primary encounter diagnosis was Bronchitis.   -- Diagnoses of SOB (shortness of breath) and  Hypoxia were also pertinent to this visit.    Disposition and Plan  -- Disposition: observation  -- Condition: stable    This note is dictated on Dragon Natural Speaking word recognition program.  There are word recognition mistakes that are occasionally missed on review.          Paco Cervantes MD  11/15/18 2100

## 2018-11-16 NOTE — PROGRESS NOTES
[facility] Medicine  Progress Note    Patient Name: Carmencita Bryson  MRN: [id number]  Patient Class: OP- Observation   Admission Date: 11/15/2018  Length of Stay: 0 days  Attending Physician: Mike Maldonado MD  Primary Care Provider: Jean Pierre Chappell MD        Subjective:     Principal Problem:Acute bronchitis due to other specified organisms    HPI:  39 YO WF presented to the emergency room last PM with profuse wheezing;  90% oxygenation on ER triage, c/o SOB and cough.    Patient has long history respiratory issues for last 6 months, heavy smoking HX  Patient has a dry hacking cough, was recently hospitalized in 2018  Patient was also then seen later that month with bronchitis, related to her smoking    CXR -The lungs are clear. The cardiac silhouette is within normal limits. No pleural effusion or pneumothorax  Noted to have ^Lactic acid 2.6>3.3   D-dimer 0.21  C/o headache this am; requesting cough and Rx, xanax 0.25mg bid;   states she is feeling much better but still with some SOB. O2 sat 90%, 2LNC, C/O sore throat- add analgesic spray    Hospital Course:  No notes on file    Past Medical History:   Diagnosis Date    Anxiety        Past Surgical History:   Procedure Laterality Date    APPENDECTOMY      APPENDECTOMY-LAPAROSCOPIC N/A 2017    [facility]     SECTION      CHOLECYSTECTOMY         Review of patient's allergies indicates:   Allergen Reactions    Tramadol Rash       No current facility-administered medications on file prior to encounter.      Current Outpatient Medications on File Prior to Encounter   Medication Sig    albuterol-ipratropium (DUO-NEB) 2.5 mg-0.5 mg/3 mL nebulizer solution Take 3 mLs by nebulization every 6 (six) hours while awake rescue    cetirizine (ZYRTEC) 10 MG tablet Take 10 mg by mouth once daily.    ondansetron (ZOFRAN-ODT) 4 MG TbDL Take 1 tablet (4 mg total) by mouth every 8  (eight) hours as needed (nausea).    predniSONE (DELTASONE) 20 MG tablet Take 3 tablets by mouth for 4 days then decrease to 2 tablets by mouth for 4 days then decrease to 1 tablet by mouth for 4 days then 1/2 tablet by mouth for 4 days    SYMBICORT 160-4.5 mcg/actuation HFAA INHALE 2 PUFFS INTO THE LUNGS EVERY 12 HOURS CONTROLLER    triamcinolone acetonide 0.1% (KENALOG) 0.1 % cream Apply topically 2 (two) times daily. Apply to each ear canal bid     Family History     Problem Relation (Age of Onset)    Hypertension Father        Tobacco Use    Smoking status: Current Every Day Smoker     Packs/day: 1.00     Years: 1.00     Pack years: 1.00     Types: Cigarettes     Start date: 9/25/2017    Smokeless tobacco: Never Used   Substance and Sexual Activity    Alcohol use: Yes     Comment: occ    Drug use: No    Sexual activity: Yes     Partners: Male     Review of Systems   Constitutional: Negative for fatigue and fever.   HENT: Positive for congestion, postnasal drip, rhinorrhea, sinus pressure and sneezing.    Eyes: Positive for itching.   Respiratory: Positive for cough, shortness of breath and wheezing.    Cardiovascular: Negative.  Negative for chest pain and palpitations.   Gastrointestinal: Negative.  Negative for diarrhea, nausea and vomiting.   Genitourinary: Negative.    Musculoskeletal: Negative.    Skin: Negative.    Neurological: Positive for headaches.   Psychiatric/Behavioral: Negative.  Negative for sleep disturbance. The patient is not nervous/anxious.      Objective:     Vital Signs (Most Recent):  Temp: 97.6 °F (36.4 °C) (11/16/18 0352)  Pulse: 84 (11/16/18 0600)  Resp: 20 (11/16/18 0506)  BP: (!) 159/89 (11/16/18 0352)  SpO2: (!) 88 % (11/16/18 0352) Vital Signs (24h Range):  Temp:  [97.3 °F (36.3 °C)-98.3 °F (36.8 °C)] 97.6 °F (36.4 °C)  Pulse:  [] 84  Resp:  [14-25] 20  SpO2:  [88 %-98 %] 88 %  BP: (140-172)/(60-91) 159/89     Weight: (!) 138.1 kg (304 lb 7.3 oz)  Body mass index is  47.68 kg/m².    Physical Exam   Constitutional: She is oriented to person, place, and time. She appears well-developed and well-nourished. No distress.   overweight   HENT:   Head: Normocephalic.   Right Ear: Tympanic membrane and ear canal normal.   Left Ear: Tympanic membrane and ear canal normal.   Nose: Nose normal.   Mouth/Throat: Uvula is midline, oropharynx is clear and moist and mucous membranes are normal. Tonsils are 0 on the right. Tonsils are 0 on the left.   Eyes: Pupils are equal, round, and reactive to light. Right eye exhibits no discharge.   Neck: Normal range of motion. Neck supple. No JVD present. No thyromegaly present.   Cardiovascular: Normal rate, regular rhythm, normal heart sounds and intact distal pulses. Exam reveals no gallop and no friction rub.   No murmur heard.  tachycardic   Pulmonary/Chest: Effort normal. She has wheezes. She has rales. She exhibits no tenderness.   Abdominal: Soft. There is no tenderness.   Musculoskeletal: Normal range of motion. She exhibits no edema or tenderness.   Lymphadenopathy:     She has no cervical adenopathy.   Neurological: She is alert and oriented to person, place, and time. No cranial nerve deficit.   Skin: Skin is dry. No rash noted.   Psychiatric: She has a normal mood and affect. Her behavior is normal. Judgment and thought content normal.   Vitals reviewed.        CRANIAL NERVES     CN III, IV, VI   Pupils are equal, round, and reactive to light.       Significant Labs:   ABGs:   Recent Labs   Lab 11/15/18  1843   PH 7.43   PCO2 38   HCO3 25.20   POCSATURATED 91.5   BE 1.00   TOTALHB 12.7   COHB 1.0   METHB 0.8     Blood Culture:   Recent Labs   Lab 11/15/18  1727 11/15/18  1728   LABBLOO No Growth to date No Growth to date     CBC:   Recent Labs   Lab 11/15/18  1727 11/16/18  0416   WBC 10.34 19.94*   HGB 13.1 13.3   HCT 39.4 39.1    298     CMP:   Recent Labs   Lab 11/15/18  1727 11/16/18  0416    140   K 3.6 4.4    106    CO2 24 24   * 162*   BUN 10 12   CREATININE 0.9 0.8   CALCIUM 9.5 10.0   PROT 7.3 7.4   ALBUMIN 4.1 4.1   BILITOT 0.5 0.7   ALKPHOS 69 68   AST 27 39   ALT 23 34   ANIONGAP 9 10   EGFRNONAA >60 >60     Cardiac Markers:   Recent Labs   Lab 11/15/18  1727   BNP 31     Lactic Acid:   Recent Labs   Lab 11/15/18  1727 11/15/18  2133 11/16/18  0056   LACTATE 2.6* 2.6* 3.3*     Lipase: No results for input(s): LIPASE in the last 48 hours.  Lipid Panel: No results for input(s): CHOL, HDL, LDLCALC, TRIG, CHOLHDL in the last 48 hours.  Magnesium: No results for input(s): MG in the last 48 hours.  POCT Glucose: No results for input(s): POCTGLUCOSE in the last 48 hours.  Respiratory Culture: No results for input(s): GSRESP, RESPIRATORYC in the last 48 hours.  Troponin:   Recent Labs   Lab 11/15/18  1727   TROPONINI <0.006     TSH: No results for input(s): TSH in the last 4320 hours.  Urine Culture: No results for input(s): LABURIN in the last 48 hours.  Urine Studies: No results for input(s): COLORU, APPEARANCEUA, PHUR, SPECGRAV, PROTEINUA, GLUCUA, KETONESU, BILIRUBINUA, OCCULTUA, NITRITE, UROBILINOGEN, LEUKOCYTESUR, RBCUA, WBCUA, BACTERIA, SQUAMEPITHEL, HYALINECASTS in the last 48 hours.    Invalid input(s): WRIGHTSUR    Significant Imaging: CXR: I have reviewed all pertinent results/findings within the past 24 hours and my personal findings are:  see below   11/15 CXR   The lungs are clear. The cardiac silhouette is within normal limits. No pleural effusion or pneumothorax    Assessment/Plan:      * Acute bronchitis due to other specified organisms    Supplemental O2 via nasal canula; titrate O2 saturation to >92%.    Solumedrol 125 mg IV q 8 hrs.   Pulmonary consultation.   Continue beta 2 agonist bronchodilator treatments.   Continue IV antibiotics; zithromax, rocephin  Check sputum GS and Cx.   Continue routine medications as before.   Daily peak flow  Add Breo         Anxiety    Continue home xanax 0.25mg  bid  Verified per [facility]- Rx per Dr. Chappell        Smoker    Nicotine patch  Discssed OP smoking cessation program   Long discussion regarding smoking cessation; counseled >10m      Asthma    Hx of Asthma  Continue O2, steroid, ABX, Neb tx   Pulmonary consult       VTE Risk Mitigation (From admission, onward)        Ordered     IP VTE HIGH RISK PATIENT  Once      11/15/18 2146     Place sequential compression device  Until discontinued      11/15/18 2146              Mike Maldonado MD  Department of Hospital Medicine   [facility]

## 2018-11-16 NOTE — HPI
37 YO WF presented to the emergency room last PM with profuse wheezing;  90% oxygenation on ER triage, c/o SOB and cough.    Patient has long history respiratory issues for last 6 months, heavy smoking HX  Patient has a dry hacking cough, was recently hospitalized in September 2018  Patient was also then seen later that month with bronchitis, related to her smoking    CXR -The lungs are clear. The cardiac silhouette is within normal limits. No pleural effusion or pneumothorax  Noted to have ^Lactic acid 2.6>3.3   D-dimer 0.21  C/o headache this am; requesting cough and Rx, xanax 0.25mg bid;   states she is feeling much better but still with some SOB. O2 sat 90%, 2LNC, C/O sore throat- add analgesic spray

## 2018-11-16 NOTE — SUBJECTIVE & OBJECTIVE
Past Medical History:   Diagnosis Date    Anxiety        Past Surgical History:   Procedure Laterality Date    APPENDECTOMY      APPENDECTOMY-LAPAROSCOPIC N/A 2017    Per[facility]     SECTION      CHOLECYSTECTOMY         Review of patient's allergies indicates:   Allergen Reactions    Tramadol Rash       No current facility-administered medications on file prior to encounter.      Current Outpatient Medications on File Prior to Encounter   Medication Sig    albuterol-ipratropium (DUO-NEB) 2.5 mg-0.5 mg/3 mL nebulizer solution Take 3 mLs by nebulization every 6 (six) hours while awake rescue    cetirizine (ZYRTEC) 10 MG tablet Take 10 mg by mouth once daily.    ondansetron (ZOFRAN-ODT) 4 MG TbDL Take 1 tablet (4 mg total) by mouth every 8 (eight) hours as needed (nausea).    predniSONE (DELTASONE) 20 MG tablet Take 3 tablets by mouth for 4 days then decrease to 2 tablets by mouth for 4 days then decrease to 1 tablet by mouth for 4 days then 1/2 tablet by mouth for 4 days    SYMBICORT 160-4.5 mcg/actuation HFAA INHALE 2 PUFFS INTO THE LUNGS EVERY 12 HOURS CONTROLLER    triamcinolone acetonide 0.1% (KENALOG) 0.1 % cream Apply topically 2 (two) times daily. Apply to each ear canal bid     Family History     Problem Relation (Age of Onset)    Hypertension Father        Tobacco Use    Smoking status: Current Every Day Smoker     Packs/day: 1.00     Years: 1.00     Pack years: 1.00     Types: Cigarettes     Start date: 2017    Smokeless tobacco: Never Used   Substance and Sexual Activity    Alcohol use: Yes     Comment: occ    Drug use: No    Sexual activity: Yes     Partners: Male     Review of Systems   Constitutional: Negative for fatigue and fever.   HENT: Positive for congestion, postnasal drip, rhinorrhea, sinus pressure and sneezing.    Eyes: Positive for itching.   Respiratory: Positive for cough, shortness of breath and wheezing.    Cardiovascular:  Negative.  Negative for chest pain and palpitations.   Gastrointestinal: Negative.  Negative for diarrhea, nausea and vomiting.   Genitourinary: Negative.    Musculoskeletal: Negative.    Skin: Negative.    Neurological: Positive for headaches.   Psychiatric/Behavioral: Negative.  Negative for sleep disturbance. The patient is not nervous/anxious.      Objective:     Vital Signs (Most Recent):  Temp: 97.2 °F (36.2 °C) (11/16/18 1112)  Pulse: 110 (11/16/18 1114)  Resp: (!) 23 (11/16/18 1114)  BP: 136/71 (11/16/18 1112)  SpO2: (!) 92 % (11/16/18 1114) Vital Signs (24h Range):  Temp:  [96.6 °F (35.9 °C)-98.3 °F (36.8 °C)] 97.2 °F (36.2 °C)  Pulse:  [] 110  Resp:  [14-28] 23  SpO2:  [88 %-98 %] 92 %  BP: (136-172)/(60-91) 136/71     Weight: (!) 138.1 kg (304 lb 7.3 oz)  Body mass index is 47.68 kg/m².    Physical Exam   Constitutional: She is oriented to person, place, and time. She appears well-developed and well-nourished. No distress.   overweight   HENT:   Head: Normocephalic.   Right Ear: Tympanic membrane and ear canal normal.   Left Ear: Tympanic membrane and ear canal normal.   Nose: Nose normal.   Mouth/Throat: Uvula is midline, oropharynx is clear and moist and mucous membranes are normal. Tonsils are 0 on the right. Tonsils are 0 on the left.   Eyes: Pupils are equal, round, and reactive to light. Right eye exhibits no discharge.   Neck: Normal range of motion. Neck supple. No JVD present. No thyromegaly present.   Cardiovascular: Normal rate, regular rhythm, normal heart sounds and intact distal pulses. Exam reveals no gallop and no friction rub.   No murmur heard.  tachycardic   Pulmonary/Chest: Effort normal. She has wheezes. She has rales. She exhibits no tenderness.   Abdominal: Soft. There is no tenderness.   Musculoskeletal: Normal range of motion. She exhibits no edema or tenderness.   Lymphadenopathy:     She has no cervical adenopathy.   Neurological: She is alert and oriented to person,  place, and time. No cranial nerve deficit.   Skin: Skin is dry. No rash noted.   Psychiatric: She has a normal mood and affect. Her behavior is normal. Judgment and thought content normal.   Vitals reviewed.        CRANIAL NERVES     CN III, IV, VI   Pupils are equal, round, and reactive to light.       Significant Labs:   ABGs:   Recent Labs   Lab 11/15/18  1843   PH 7.43   PCO2 38   HCO3 25.20   POCSATURATED 91.5   BE 1.00   TOTALHB 12.7   COHB 1.0   METHB 0.8     Blood Culture:   Recent Labs   Lab 11/15/18  1727 11/15/18  1728   LABBLOO No Growth to date No Growth to date     CBC:   Recent Labs   Lab 11/15/18  1727 11/16/18  0416   WBC 10.34 19.94*   HGB 13.1 13.3   HCT 39.4 39.1    298     CMP:   Recent Labs   Lab 11/15/18  1727 11/16/18  0416    140   K 3.6 4.4    106   CO2 24 24   * 162*   BUN 10 12   CREATININE 0.9 0.8   CALCIUM 9.5 10.0   PROT 7.3 7.4   ALBUMIN 4.1 4.1   BILITOT 0.5 0.7   ALKPHOS 69 68   AST 27 39   ALT 23 34   ANIONGAP 9 10   EGFRNONAA >60 >60     Cardiac Markers:   Recent Labs   Lab 11/15/18  1727   BNP 31     Lactic Acid:   Recent Labs   Lab 11/15/18  1727 11/15/18  2133 11/16/18  0056   LACTATE 2.6* 2.6* 3.3*     Lipase: No results for input(s): LIPASE in the last 48 hours.  Lipid Panel: No results for input(s): CHOL, HDL, LDLCALC, TRIG, CHOLHDL in the last 48 hours.  Magnesium: No results for input(s): MG in the last 48 hours.  POCT Glucose: No results for input(s): POCTGLUCOSE in the last 48 hours.  Respiratory Culture: No results for input(s): GSRESP, RESPIRATORYC in the last 48 hours.  Troponin:   Recent Labs   Lab 11/15/18  1727   TROPONINI <0.006     TSH: No results for input(s): TSH in the last 4320 hours.  Urine Culture: No results for input(s): LABURIN in the last 48 hours.  Urine Studies: No results for input(s): COLORU, APPEARANCEUA, PHUR, SPECGRAV, PROTEINUA, GLUCUA, KETONESU, BILIRUBINUA, OCCULTUA, NITRITE, UROBILINOGEN, LEUKOCYTESUR, RBCUA, WBCUA,  BACTERIA, SQUAMEPITHEL, HYALINECASTS in the last 48 hours.    Invalid input(s): SANTOR    Significant Imaging: CXR: I have reviewed all pertinent results/findings within the past 24 hours and my personal findings are:  see below   11/15 CXR   The lungs are clear. The cardiac silhouette is within normal limits. No pleural effusion or pneumothorax

## 2018-11-16 NOTE — ASSESSMENT & PLAN NOTE
Nicotine patch  Discssed OP smoking cessation program   Long discussion regarding smoking cessation; counseled >10m

## 2018-11-16 NOTE — NURSING
Report received from Rishi.Patient arrived via WC to room escorted by Jen, Assessed per PCS flowsheet, some respiratory distress noted especially with exertion. Pt wanted to shower, escorted to bathroom prepped for shower, after placed on tele, respiratory notified of pt request for nebulizer. Will cont to monitor.

## 2018-11-16 NOTE — SUBJECTIVE & OBJECTIVE
Past Medical History:   Diagnosis Date    Anxiety        Past Surgical History:   Procedure Laterality Date    APPENDECTOMY      APPENDECTOMY-LAPAROSCOPIC N/A 2017    Performed by Gary Almaguer MD at [facility] OR     SECTION      CHOLECYSTECTOMY         Review of patient's allergies indicates:   Allergen Reactions    Tramadol Rash       No current facility-administered medications on file prior to encounter.      Current Outpatient Medications on File Prior to Encounter   Medication Sig    albuterol-ipratropium (DUO-NEB) 2.5 mg-0.5 mg/3 mL nebulizer solution Take 3 mLs by nebulization every 6 (six) hours while awake rescue    cetirizine (ZYRTEC) 10 MG tablet Take 10 mg by mouth once daily.    ondansetron (ZOFRAN-ODT) 4 MG TbDL Take 1 tablet (4 mg total) by mouth every 8 (eight) hours as needed (nausea).    predniSONE (DELTASONE) 20 MG tablet Take 3 tablets by mouth for 4 days then decrease to 2 tablets by mouth for 4 days then decrease to 1 tablet by mouth for 4 days then 1/2 tablet by mouth for 4 days    SYMBICORT 160-4.5 mcg/actuation HFAA INHALE 2 PUFFS INTO THE LUNGS EVERY 12 HOURS CONTROLLER    triamcinolone acetonide 0.1% (KENALOG) 0.1 % cream Apply topically 2 (two) times daily. Apply to each ear canal bid     Family History     Problem Relation (Age of Onset)    Hypertension Father        Tobacco Use    Smoking status: Current Every Day Smoker     Packs/day: 1.00     Years: 1.00     Pack years: 1.00     Types: Cigarettes     Start date: 2017    Smokeless tobacco: Never Used   Substance and Sexual Activity    Alcohol use: Yes     Comment: occ    Drug use: No    Sexual activity: Yes     Partners: Male     Review of Systems   Constitutional: Negative for fatigue and fever.   HENT: Positive for congestion, postnasal drip, rhinorrhea, sinus pressure and sneezing.    Eyes: Positive for itching.   Respiratory: Positive for cough, shortness of breath and wheezing.    Cardiovascular:  Negative.  Negative for chest pain and palpitations.   Gastrointestinal: Negative.  Negative for diarrhea, nausea and vomiting.   Genitourinary: Negative.    Musculoskeletal: Negative.    Skin: Negative.    Neurological: Positive for headaches.   Psychiatric/Behavioral: Negative.  Negative for sleep disturbance. The patient is not nervous/anxious.      Objective:     Vital Signs (Most Recent):  Temp: 97.6 °F (36.4 °C) (11/16/18 0352)  Pulse: 84 (11/16/18 0600)  Resp: 20 (11/16/18 0506)  BP: (!) 159/89 (11/16/18 0352)  SpO2: (!) 88 % (11/16/18 0352) Vital Signs (24h Range):  Temp:  [97.3 °F (36.3 °C)-98.3 °F (36.8 °C)] 97.6 °F (36.4 °C)  Pulse:  [] 84  Resp:  [14-25] 20  SpO2:  [88 %-98 %] 88 %  BP: (140-172)/(60-91) 159/89     Weight: (!) 138.1 kg (304 lb 7.3 oz)  Body mass index is 47.68 kg/m².    Physical Exam   Constitutional: She is oriented to person, place, and time. She appears well-developed and well-nourished. No distress.   overweight   HENT:   Head: Normocephalic.   Right Ear: Tympanic membrane and ear canal normal.   Left Ear: Tympanic membrane and ear canal normal.   Nose: Nose normal.   Mouth/Throat: Uvula is midline, oropharynx is clear and moist and mucous membranes are normal. Tonsils are 0 on the right. Tonsils are 0 on the left.   Eyes: Pupils are equal, round, and reactive to light. Right eye exhibits no discharge.   Neck: Normal range of motion. Neck supple. No JVD present. No thyromegaly present.   Cardiovascular: Normal rate, regular rhythm, normal heart sounds and intact distal pulses. Exam reveals no gallop and no friction rub.   No murmur heard.  tachycardic   Pulmonary/Chest: Effort normal. She has wheezes. She has rales. She exhibits no tenderness.   Abdominal: Soft. There is no tenderness.   Musculoskeletal: Normal range of motion. She exhibits no edema or tenderness.   Lymphadenopathy:     She has no cervical adenopathy.   Neurological: She is alert and oriented to person, place,  and time. No cranial nerve deficit.   Skin: Skin is dry. No rash noted.   Psychiatric: She has a normal mood and affect. Her behavior is normal. Judgment and thought content normal.   Vitals reviewed.        CRANIAL NERVES     CN III, IV, VI   Pupils are equal, round, and reactive to light.       Significant Labs:   ABGs:   Recent Labs   Lab 11/15/18  1843   PH 7.43   PCO2 38   HCO3 25.20   POCSATURATED 91.5   BE 1.00   TOTALHB 12.7   COHB 1.0   METHB 0.8     Blood Culture:   Recent Labs   Lab 11/15/18  1727 11/15/18  1728   LABBLOO No Growth to date No Growth to date     CBC:   Recent Labs   Lab 11/15/18  1727 11/16/18  0416   WBC 10.34 19.94*   HGB 13.1 13.3   HCT 39.4 39.1    298     CMP:   Recent Labs   Lab 11/15/18  1727 11/16/18  0416    140   K 3.6 4.4    106   CO2 24 24   * 162*   BUN 10 12   CREATININE 0.9 0.8   CALCIUM 9.5 10.0   PROT 7.3 7.4   ALBUMIN 4.1 4.1   BILITOT 0.5 0.7   ALKPHOS 69 68   AST 27 39   ALT 23 34   ANIONGAP 9 10   EGFRNONAA >60 >60     Cardiac Markers:   Recent Labs   Lab 11/15/18  1727   BNP 31     Lactic Acid:   Recent Labs   Lab 11/15/18  1727 11/15/18  2133 11/16/18  0056   LACTATE 2.6* 2.6* 3.3*     Lipase: No results for input(s): LIPASE in the last 48 hours.  Lipid Panel: No results for input(s): CHOL, HDL, LDLCALC, TRIG, CHOLHDL in the last 48 hours.  Magnesium: No results for input(s): MG in the last 48 hours.  POCT Glucose: No results for input(s): POCTGLUCOSE in the last 48 hours.  Respiratory Culture: No results for input(s): GSRESP, RESPIRATORYC in the last 48 hours.  Troponin:   Recent Labs   Lab 11/15/18  1727   TROPONINI <0.006     TSH: No results for input(s): TSH in the last 4320 hours.  Urine Culture: No results for input(s): LABURIN in the last 48 hours.  Urine Studies: No results for input(s): COLORU, APPEARANCEUA, PHUR, SPECGRAV, PROTEINUA, GLUCUA, KETONESU, BILIRUBINUA, OCCULTUA, NITRITE, UROBILINOGEN, LEUKOCYTESUR, RBCUA, WBCUA,  BACTERIA, SQUAMEPITHEL, HYALINECASTS in the last 48 hours.    Invalid input(s): SANTOR    Significant Imaging: CXR: I have reviewed all pertinent results/findings within the past 24 hours and my personal findings are:  see below   11/15 CXR   The lungs are clear. The cardiac silhouette is within normal limits. No pleural effusion or pneumothorax

## 2018-11-16 NOTE — ED NOTES
The patient is awake, alert and cooperative with a calm affect, patient is aware of environment. Airway is open and patent, respirations are spontaneous, normal respiratory effort and rate noted, skin warm and dry, full ROM in all extremities, appearance: no apparent distress noted, resting comfortably.  VSS, no change from previous assessment.  Bed in low, locked position, SR x2, HOB 30 degrees.  Pt able to change position independently. Will continue to monitor.

## 2018-11-16 NOTE — PLAN OF CARE
11/16/18 1116   Discharge Assessment   Assessment Type Discharge Planning Reassessment     Carmencita plans to discharge back to her home when stable. She has no issues or concerns for post acute care. Discharge brochure and contact information given.

## 2018-11-16 NOTE — CONSULTS
[facility]  Pulmonology  Consult Note    Patient Name: Carmencita Bryson  MRN: [id number]  Admission Date: 11/15/2018  Hospital Length of Stay: 0 days  Code Status: Full Code  Attending Physician: Mike Maldonado MD  Primary Care Provider: Jean Pierre Chappell MD   Principal Problem: Acute bronchitis due to other specified organisms    Consults  Subjective:     HPI:  Carmencita Bryson is a 38 y.o. year old female that's presents with a chief complaint of SOB and Wheezing for 30 days.Pt seen and admitted through ER for progressive SOB due to smoking she states that she quit smoking yesterday . Consulted to evaluate Respiratory status.    Past Medical History:   Diagnosis Date    Anxiety         Past Surgical History:   Procedure Laterality Date    APPENDECTOMY      APPENDECTOMY-LAPAROSCOPIC N/A 2017    Performed by Gary Almaguer MD at [facility] OR     SECTION      CHOLECYSTECTOMY         Prior to Admission medications    Medication Sig Start Date End Date Taking? Authorizing Provider   albuterol (PROVENTIL) 2.5 mg /3 mL (0.083 %) nebulizer solution Take 3 mLs (2.5 mg total) by nebulization every 6 (six) hours as needed for Wheezing. 11/15/18 11/15/19  Paco Cervantes MD   albuterol (PROVENTIL/VENTOLIN HFA) 90 mcg/actuation inhaler Inhale 1-2 puffs into the lungs every 6 (six) hours as needed for Wheezing (sob). 11/15/18 11/15/19  Paco Cervantes MD   albuterol-ipratropium (DUO-NEB) 2.5 mg-0.5 mg/3 mL nebulizer solution Take 3 mLs by nebulization every 6 (six) hours while awake rescue 18  Chiara Campa NP   cetirizine (ZYRTEC) 10 MG tablet Take 10 mg by mouth once daily.    Historical Provider, MD   levoFLOXacin (LEVAQUIN) 500 MG tablet Take 1 tablet (500 mg total) by mouth once daily. for 7 days 11/15/18 11/22/18  Paco Cervantes MD   methylPREDNISolone (MEDROL DOSEPACK) 4 mg tablet Pack as directed 11/15/18   Paco Cervantes MD   ondansetron (ZOFRAN-ODT) 4 MG TbDL  Take 1 tablet (4 mg total) by mouth every 8 (eight) hours as needed (nausea). 6/25/18   Neeraj Casper MD   predniSONE (DELTASONE) 20 MG tablet Take 3 tablets by mouth for 4 days then decrease to 2 tablets by mouth for 4 days then decrease to 1 tablet by mouth for 4 days then 1/2 tablet by mouth for 4 days 9/27/18   Chiara Campa NP   promethazine-dextromethorphan (PROMETHAZINE-DM) 6.25-15 mg/5 mL Syrp Take 5 mLs by mouth every 6 (six) hours as needed (cough). 11/15/18 11/25/18  Paco Cervantes MD   SYMBICORT 160-4.5 mcg/actuation HFAA INHALE 2 PUFFS INTO THE LUNGS EVERY 12 HOURS CONTROLLER 10/30/18   Chiara Campa NP   triamcinolone acetonide 0.1% (KENALOG) 0.1 % cream Apply topically 2 (two) times daily. Apply to each ear canal bid 8/15/16 8/25/16  José Vanegas MD       Social History     Socioeconomic History    Marital status:      Spouse name: Not on file    Number of children: Not on file    Years of education: Not on file    Highest education level: Not on file   Social Needs    Financial resource strain: Not on file    Food insecurity - worry: Not on file    Food insecurity - inability: Not on file    Transportation needs - medical: Not on file    Transportation needs - non-medical: Not on file   Occupational History    Not on file   Tobacco Use    Smoking status: Current Every Day Smoker     Packs/day: 1.00     Years: 1.00     Pack years: 1.00     Types: Cigarettes     Start date: 9/25/2017    Smokeless tobacco: Never Used   Substance and Sexual Activity    Alcohol use: Yes     Comment: occ    Drug use: No    Sexual activity: Yes     Partners: Male   Other Topics Concern    Not on file   Social History Narrative    Not on file       Family History   Problem Relation Age of Onset    Hypertension Father        Review of patient's allergies indicates:   Allergen Reactions    Tramadol Rash    Allergies have been reviewed.     ROS: Review of Systems    Constitutional: Negative for chills, fever and weight loss.   HENT: Negative for sore throat.    Eyes: Negative for double vision and photophobia.   Respiratory: Positive for cough, sputum production and shortness of breath.    Cardiovascular: Positive for leg swelling (mild) and PND (occasional). Negative for palpitations.   Gastrointestinal: Negative for abdominal pain and diarrhea.   Genitourinary: Negative for dysuria and frequency.   Musculoskeletal: Positive for myalgias (generalized). Negative for back pain and neck pain.   Skin: Negative.    Neurological: Negative for dizziness, weakness and headaches.   Endo/Heme/Allergies: Does not bruise/bleed easily.   Psychiatric/Behavioral: Negative for memory loss. The patient has insomnia (intermittant ).        PE:   Vitals:    11/16/18 1200 11/16/18 1347 11/16/18 1424 11/16/18 1526   BP:       BP Location:       Patient Position:       Pulse: (!) 114  105 101   Resp:    19   Temp:       TempSrc:       SpO2:  (!) 91%  (!) 93%   Weight:       Height:        Physical Exam    Alert and orientated X 3   HEENT: Head: Normocephalic no trauma                Ears : Normal Pinna No Drainage no Battles sign                Eyes: Vision Unchanged, No conjunctivitis,No drainage                Neck: Supple, No JVD,No Abnormal Carotid Pulsations                Throat: No Erythema, No pus,No Swelling,Mallampati score= 3    Chest: course BS with bilateral upper and lower lung wheezing and rhonchi moderate  Cardiac: RRR S1+ S2 with a -S3: +M = 2/6, No R/H/G  Abdomen: Bowel Sounds are Normal.Soft Abdomen. No organomegaly of Liver,Spleen,or Kidneys   CNS: Non focal and intact. Cranial nerves 2, 346,8,9,10 and 12 are normal.Norrmal gait.Normal posture.  Extremities: No Clubbing,No Cyanosis with oxygen,Positive mild edema of lower extremities Bilateral  Skin: No Rash, No Ulcerative sores,and No cellulitis of the IV site.    Lab Results   Component Value Date    WBC 19.94 (H) 11/16/2018     HGB 13.3 11/16/2018    HCT 39.1 11/16/2018     11/16/2018    CHOL 201 (H) 05/13/2015    TRIG 270 (H) 05/13/2015    HDL 36 (L) 05/13/2015    ALT 34 11/16/2018    AST 39 11/16/2018     11/16/2018    K 4.4 11/16/2018     11/16/2018    CREATININE 0.8 11/16/2018    BUN 12 11/16/2018    CO2 24 11/16/2018    TSH 4.916 (H) 05/13/2015    INR 1.0 11/15/2018               Assessment/Plan:     * Acute bronchitis due to other specified organisms    Pt with coughing and wheezing      Smoker    Quit yesterday      Asthma    Secondary to smoking            Thank you for your consult. I will follow-up with patient. Please contact us if you have any additional questions.     Vineet Toledo MD  Pulmonology  [facility]

## 2018-11-16 NOTE — H&P
[facility] Hospital Medicine  History & Physical    Patient Name: Carmencita Bryson  MRN: [id number]  Admission Date: 11/15/2018  Attending Physician: Mike Maldonado MD   Primary Care Provider: Jean Pierre Chappell MD         Patient information was obtained from patient and ER records.     Subjective:     Principal Problem:Acute bronchitis due to other specified organisms    Chief Complaint:   Chief Complaint   Patient presents with    Cough    Nasal Congestion        HPI: 37 YO WF presented to the emergency room last PM with profuse wheezing;  90% oxygenation on ER triage, c/o SOB and cough.    Patient has long history respiratory issues for last 6 months, heavy smoking HX  Patient has a dry hacking cough, was recently hospitalized in 2018  Patient was also then seen later that month with bronchitis, related to her smoking    CXR -The lungs are clear. The cardiac silhouette is within normal limits. No pleural effusion or pneumothorax  Noted to have ^Lactic acid 2.6>3.3   D-dimer 0.21  C/o headache this am; requesting cough and Rx, xanax 0.25mg bid;   states she is feeling much better but still with some SOB. O2 sat 90%, 2LNC, C/O sore throat- add analgesic spray    Past Medical History:   Diagnosis Date    Anxiety        Past Surgical History:   Procedure Laterality Date    APPENDECTOMY      APPENDECTOMY-LAPAROSCOPIC N/A 2017    Per[facility] OR     SECTION      CHOLECYSTECTOMY         Review of patient's allergies indicates:   Allergen Reactions    Tramadol Rash       No current facility-administered medications on file prior to encounter.      Current Outpatient Medications on File Prior to Encounter   Medication Sig    albuterol-ipratropium (DUO-NEB) 2.5 mg-0.5 mg/3 mL nebulizer solution Take 3 mLs by nebulization every 6 (six) hours while awake rescue    cetirizine (ZYRTEC) 10 MG tablet Take 10 mg by mouth once daily.    ondansetron  (ZOFRAN-ODT) 4 MG TbDL Take 1 tablet (4 mg total) by mouth every 8 (eight) hours as needed (nausea).    predniSONE (DELTASONE) 20 MG tablet Take 3 tablets by mouth for 4 days then decrease to 2 tablets by mouth for 4 days then decrease to 1 tablet by mouth for 4 days then 1/2 tablet by mouth for 4 days    SYMBICORT 160-4.5 mcg/actuation HFAA INHALE 2 PUFFS INTO THE LUNGS EVERY 12 HOURS CONTROLLER    triamcinolone acetonide 0.1% (KENALOG) 0.1 % cream Apply topically 2 (two) times daily. Apply to each ear canal bid     Family History     Problem Relation (Age of Onset)    Hypertension Father        Tobacco Use    Smoking status: Current Every Day Smoker     Packs/day: 1.00     Years: 1.00     Pack years: 1.00     Types: Cigarettes     Start date: 9/25/2017    Smokeless tobacco: Never Used   Substance and Sexual Activity    Alcohol use: Yes     Comment: occ    Drug use: No    Sexual activity: Yes     Partners: Male     Review of Systems   Constitutional: Negative for fatigue and fever.   HENT: Positive for congestion, postnasal drip, rhinorrhea, sinus pressure and sneezing.    Eyes: Positive for itching.   Respiratory: Positive for cough, shortness of breath and wheezing.    Cardiovascular: Negative.  Negative for chest pain and palpitations.   Gastrointestinal: Negative.  Negative for diarrhea, nausea and vomiting.   Genitourinary: Negative.    Musculoskeletal: Negative.    Skin: Negative.    Neurological: Positive for headaches.   Psychiatric/Behavioral: Negative.  Negative for sleep disturbance. The patient is not nervous/anxious.      Objective:     Vital Signs (Most Recent):  Temp: 97.2 °F (36.2 °C) (11/16/18 1112)  Pulse: 110 (11/16/18 1114)  Resp: (!) 23 (11/16/18 1114)  BP: 136/71 (11/16/18 1112)  SpO2: (!) 92 % (11/16/18 1114) Vital Signs (24h Range):  Temp:  [96.6 °F (35.9 °C)-98.3 °F (36.8 °C)] 97.2 °F (36.2 °C)  Pulse:  [] 110  Resp:  [14-28] 23  SpO2:  [88 %-98 %] 92 %  BP: (136-172)/(60-91)  136/71     Weight: (!) 138.1 kg (304 lb 7.3 oz)  Body mass index is 47.68 kg/m².    Physical Exam   Constitutional: She is oriented to person, place, and time. She appears well-developed and well-nourished. No distress.   overweight   HENT:   Head: Normocephalic.   Right Ear: Tympanic membrane and ear canal normal.   Left Ear: Tympanic membrane and ear canal normal.   Nose: Nose normal.   Mouth/Throat: Uvula is midline, oropharynx is clear and moist and mucous membranes are normal. Tonsils are 0 on the right. Tonsils are 0 on the left.   Eyes: Pupils are equal, round, and reactive to light. Right eye exhibits no discharge.   Neck: Normal range of motion. Neck supple. No JVD present. No thyromegaly present.   Cardiovascular: Normal rate, regular rhythm, normal heart sounds and intact distal pulses. Exam reveals no gallop and no friction rub.   No murmur heard.  tachycardic   Pulmonary/Chest: Effort normal. She has wheezes. She has rales. She exhibits no tenderness.   Abdominal: Soft. There is no tenderness.   Musculoskeletal: Normal range of motion. She exhibits no edema or tenderness.   Lymphadenopathy:     She has no cervical adenopathy.   Neurological: She is alert and oriented to person, place, and time. No cranial nerve deficit.   Skin: Skin is dry. No rash noted.   Psychiatric: She has a normal mood and affect. Her behavior is normal. Judgment and thought content normal.   Vitals reviewed.        CRANIAL NERVES     CN III, IV, VI   Pupils are equal, round, and reactive to light.       Significant Labs:   ABGs:   Recent Labs   Lab 11/15/18  1843   PH 7.43   PCO2 38   HCO3 25.20   POCSATURATED 91.5   BE 1.00   TOTALHB 12.7   COHB 1.0   METHB 0.8     Blood Culture:   Recent Labs   Lab 11/15/18  1727 11/15/18  1728   LABBLOO No Growth to date No Growth to date     CBC:   Recent Labs   Lab 11/15/18  1727 11/16/18  0416   WBC 10.34 19.94*   HGB 13.1 13.3   HCT 39.4 39.1    298     CMP:   Recent Labs   Lab  11/15/18  1727 11/16/18  0416    140   K 3.6 4.4    106   CO2 24 24   * 162*   BUN 10 12   CREATININE 0.9 0.8   CALCIUM 9.5 10.0   PROT 7.3 7.4   ALBUMIN 4.1 4.1   BILITOT 0.5 0.7   ALKPHOS 69 68   AST 27 39   ALT 23 34   ANIONGAP 9 10   EGFRNONAA >60 >60     Cardiac Markers:   Recent Labs   Lab 11/15/18  1727   BNP 31     Lactic Acid:   Recent Labs   Lab 11/15/18  1727 11/15/18  2133 11/16/18  0056   LACTATE 2.6* 2.6* 3.3*     Lipase: No results for input(s): LIPASE in the last 48 hours.  Lipid Panel: No results for input(s): CHOL, HDL, LDLCALC, TRIG, CHOLHDL in the last 48 hours.  Magnesium: No results for input(s): MG in the last 48 hours.  POCT Glucose: No results for input(s): POCTGLUCOSE in the last 48 hours.  Respiratory Culture: No results for input(s): GSRESP, RESPIRATORYC in the last 48 hours.  Troponin:   Recent Labs   Lab 11/15/18  1727   TROPONINI <0.006     TSH: No results for input(s): TSH in the last 4320 hours.  Urine Culture: No results for input(s): LABURIN in the last 48 hours.  Urine Studies: No results for input(s): COLORU, APPEARANCEUA, PHUR, SPECGRAV, PROTEINUA, GLUCUA, KETONESU, BILIRUBINUA, OCCULTUA, NITRITE, UROBILINOGEN, LEUKOCYTESUR, RBCUA, WBCUA, BACTERIA, SQUAMEPITHEL, HYALINECASTS in the last 48 hours.    Invalid input(s): WRIGHTSUR    Significant Imaging: CXR: I have reviewed all pertinent results/findings within the past 24 hours and my personal findings are:  see below   11/15 CXR   The lungs are clear. The cardiac silhouette is within normal limits. No pleural effusion or pneumothorax    Assessment/Plan:     * Acute bronchitis due to other specified organisms    Supplemental O2 via nasal canula; titrate O2 saturation to >92%.    Solumedrol 125 mg IV q 8 hrs.   Pulmonary consultation.   Continue beta 2 agonist bronchodilator treatments.   Continue IV antibiotics; zithromax, rocephin  Check sputum GS and Cx.   Continue routine medications as before.   Daily peak  flow  Add Breo         Anxiety    Continue home xanax 0.25mg bid  Verified per [facility]- Rx per Dr. Chappell        Smoker    Nicotine patch  Discssed OP smoking cessation program   Long discussion regarding smoking cessation; counseled >10m      Asthma    Hx of Asthma  Continue O2, steroid, ABX, Neb tx   Pulmonary consult       VTE Risk Mitigation (From admission, onward)        Ordered     IP VTE HIGH RISK PATIENT  Once      11/15/18 2146     Place sequential compression device  Until discontinued      11/15/18 2146             Mike Maldonado MD  Department of Hospital Medicine   [facility]

## 2018-11-16 NOTE — NURSING
Dr Toledo notified of consult, verbalized they pt is in rm 302 and will be seen. Informed of pt dx of athma and brochitis.

## 2018-11-16 NOTE — PLAN OF CARE
11/16/18 0958   Discharge Assessment   Assessment Type Discharge Planning Assessment   Confirmed/corrected address and phone number on facesheet? Yes   Assessment information obtained from? Patient;Medical Record   Expected Length of Stay (days) 2   Communicated expected length of stay with patient/caregiver yes   Prior to hospitilization cognitive status: Alert/Oriented   Prior to hospitalization functional status: Independent   Current cognitive status: Alert/Oriented   Current Functional Status: Independent   Lives With spouse   Able to Return to Prior Arrangements yes   Is patient able to care for self after discharge? Yes   Who are your caregiver(s) and their phone number(s)? Komal Day - [phone]   Patient's perception of discharge disposition home or selfcare   Readmission Within The Last 30 Days no previous admission in last 30 days   Patient currently being followed by outpatient case management? No   Patient currently receives any other outside agency services? No   Equipment Currently Used at Home nebulizer   Do you have any problems affording any of your prescribed medications? No   Is the patient taking medications as prescribed? yes   Does the patient have transportation home? Yes   Transportation Available family or friend will provide   Does the patient receive services at the [facility]? No   Discharge Plan A Home   Discharge Plan B Home   Patient/Family In Agreement With Plan yes       Pt is a 38 year old female admitted for SOB.  All questions were answered. No Social Work needs noted at this time. Will continue to follow and offer support as needed. Discharge Planning brochure given to the patient.    Tyrese Martin LMSW

## 2018-11-16 NOTE — NURSING
Dr Akhtar notified of higher Lactic acid. Lactic acid increased from 2.6 to 3.3. MD reports since WBC is normal, and temp and BP is wnl, most likely is related related to Nebulizer treatments. No new orders, will cont to monitor.

## 2018-11-16 NOTE — PLAN OF CARE
Problem: Patient Care Overview  Goal: Plan of Care Review  Outcome: Ongoing (interventions implemented as appropriate)  Educate patient/family/caregiver on application and use of metered dose inhaler to deliver bronchodilators and/or steroids.  Educate patient on proper technique and guidelines with or without spacer.Patient educated on use, benefits, & safety of oxygen use.Educate patient/family/cargiver about administration of aerosolized medications via the inhalation route to aid in bronchial hygiene & deliver medications.  Educate patient/family/cargiver benefits and use of peak flow by providing a simple, quantitative, & reproducible measure of existence & severity of airflow obstruction.      Predicted Peak Flow Value     Green Zone 80%-100%    Yellow Zone 50%-80%    Red Zone <50%

## 2018-11-16 NOTE — PLAN OF CARE
Problem: Patient Care Overview  Goal: Plan of Care Review  Outcome: Ongoing (interventions implemented as appropriate)  Patient aware of plan of care. Patient had a good day, feeling better. Up to shower per self. IV antibiotics and steroids continued. Breathing tx every 4 hours PRN. NSR/ST on tele. Smoking cessation ordered. Free from falls/injuries. No questions or concerns at this time. Agrees with plan of care.

## 2018-11-17 VITALS
RESPIRATION RATE: 17 BRPM | DIASTOLIC BLOOD PRESSURE: 83 MMHG | HEART RATE: 92 BPM | OXYGEN SATURATION: 90 % | BODY MASS INDEX: 45.99 KG/M2 | TEMPERATURE: 97 F | WEIGHT: 293 LBS | HEIGHT: 67 IN | SYSTOLIC BLOOD PRESSURE: 142 MMHG

## 2018-11-17 LAB
ANION GAP SERPL CALC-SCNC: 10 MMOL/L
BASOPHILS # BLD AUTO: ABNORMAL K/UL
BASOPHILS NFR BLD: 0 %
BUN SERPL-MCNC: 14 MG/DL
CALCIUM SERPL-MCNC: 9.7 MG/DL
CHLORIDE SERPL-SCNC: 108 MMOL/L
CO2 SERPL-SCNC: 23 MMOL/L
CREAT SERPL-MCNC: 0.7 MG/DL
DIFFERENTIAL METHOD: ABNORMAL
EOSINOPHIL # BLD AUTO: ABNORMAL K/UL
EOSINOPHIL NFR BLD: 0 %
ERYTHROCYTE [DISTWIDTH] IN BLOOD BY AUTOMATED COUNT: 15.3 %
EST. GFR  (AFRICAN AMERICAN): >60 ML/MIN/1.73 M^2
EST. GFR  (NON AFRICAN AMERICAN): >60 ML/MIN/1.73 M^2
GLUCOSE SERPL-MCNC: 171 MG/DL
HCT VFR BLD AUTO: 37.6 %
HGB BLD-MCNC: 12.7 G/DL
LYMPHOCYTES # BLD AUTO: ABNORMAL K/UL
LYMPHOCYTES NFR BLD: 7 %
MCH RBC QN AUTO: 28 PG
MCHC RBC AUTO-ENTMCNC: 33.8 G/DL
MCV RBC AUTO: 83 FL
MONOCYTES # BLD AUTO: ABNORMAL K/UL
MONOCYTES NFR BLD: 5 %
NEUTROPHILS NFR BLD: 87 %
NEUTS BAND NFR BLD MANUAL: 1 %
PLATELET # BLD AUTO: 318 K/UL
PLATELET BLD QL SMEAR: ABNORMAL
PMV BLD AUTO: 11.3 FL
POTASSIUM SERPL-SCNC: 4.5 MMOL/L
RBC # BLD AUTO: 4.53 M/UL
SODIUM SERPL-SCNC: 141 MMOL/L
WBC # BLD AUTO: 24.25 K/UL

## 2018-11-17 PROCEDURE — 94640 AIRWAY INHALATION TREATMENT: CPT

## 2018-11-17 PROCEDURE — 90471 IMMUNIZATION ADMIN: CPT | Performed by: FAMILY MEDICINE

## 2018-11-17 PROCEDURE — 94760 N-INVAS EAR/PLS OXIMETRY 1: CPT

## 2018-11-17 PROCEDURE — 99900035 HC TECH TIME PER 15 MIN (STAT)

## 2018-11-17 PROCEDURE — 25000242 PHARM REV CODE 250 ALT 637 W/ HCPCS: Performed by: INTERNAL MEDICINE

## 2018-11-17 PROCEDURE — 63600175 PHARM REV CODE 636 W HCPCS: Performed by: FAMILY MEDICINE

## 2018-11-17 PROCEDURE — G0378 HOSPITAL OBSERVATION PER HR: HCPCS

## 2018-11-17 PROCEDURE — 85007 BL SMEAR W/DIFF WBC COUNT: CPT | Mod: NCS

## 2018-11-17 PROCEDURE — 27000221 HC OXYGEN, UP TO 24 HOURS

## 2018-11-17 PROCEDURE — 25000003 PHARM REV CODE 250: Performed by: NURSE PRACTITIONER

## 2018-11-17 PROCEDURE — 25000003 PHARM REV CODE 250: Performed by: SURGERY

## 2018-11-17 PROCEDURE — 80048 BASIC METABOLIC PNL TOTAL CA: CPT

## 2018-11-17 PROCEDURE — 63600175 PHARM REV CODE 636 W HCPCS: Performed by: SURGERY

## 2018-11-17 PROCEDURE — 96376 TX/PRO/DX INJ SAME DRUG ADON: CPT | Performed by: SURGERY

## 2018-11-17 PROCEDURE — 36415 COLL VENOUS BLD VENIPUNCTURE: CPT

## 2018-11-17 PROCEDURE — 85027 COMPLETE CBC AUTOMATED: CPT

## 2018-11-17 PROCEDURE — 99238 HOSP IP/OBS DSCHRG MGMT 30/<: CPT | Mod: ,,, | Performed by: FAMILY MEDICINE

## 2018-11-17 PROCEDURE — 90686 IIV4 VACC NO PRSV 0.5 ML IM: CPT | Performed by: FAMILY MEDICINE

## 2018-11-17 RX ORDER — PREDNISONE 10 MG/1
TABLET ORAL
Qty: 30 TABLET | Refills: 0 | Status: SHIPPED | OUTPATIENT
Start: 2018-11-17 | End: 2020-12-19 | Stop reason: ALTCHOICE

## 2018-11-17 RX ORDER — BUPROPION HYDROCHLORIDE 150 MG/1
150 TABLET ORAL DAILY
Qty: 30 TABLET | Refills: 0 | Status: SHIPPED | OUTPATIENT
Start: 2018-11-17 | End: 2019-01-12 | Stop reason: SDUPTHER

## 2018-11-17 RX ORDER — IBUPROFEN 200 MG
1 TABLET ORAL DAILY PRN
Refills: 0 | COMMUNITY
Start: 2018-11-17

## 2018-11-17 RX ORDER — ALBUTEROL SULFATE 90 UG/1
2 AEROSOL, METERED RESPIRATORY (INHALATION) EVERY 4 HOURS PRN
Qty: 1 INHALER | Refills: 5 | Status: SHIPPED | OUTPATIENT
Start: 2018-11-17 | End: 2020-07-07 | Stop reason: ALTCHOICE

## 2018-11-17 RX ADMIN — FLUTICASONE FUROATE AND VILANTEROL TRIFENATATE 1 PUFF: 200; 25 POWDER RESPIRATORY (INHALATION) at 07:11

## 2018-11-17 RX ADMIN — PANTOPRAZOLE SODIUM 40 MG: 40 TABLET, DELAYED RELEASE ORAL at 08:11

## 2018-11-17 RX ADMIN — METHYLPREDNISOLONE SODIUM SUCCINATE 125 MG: 125 INJECTION, POWDER, FOR SOLUTION INTRAMUSCULAR; INTRAVENOUS at 05:11

## 2018-11-17 RX ADMIN — INFLUENZA A VIRUS A/MICHIGAN/45/2015 X-275 (H1N1) ANTIGEN (FORMALDEHYDE INACTIVATED), INFLUENZA A VIRUS A/SINGAPORE/INFIMH-16-0019/2016 IVR-186 (H3N2) ANTIGEN (FORMALDEHYDE INACTIVATED), INFLUENZA B VIRUS B/PHUKET/3073/2013 ANTIGEN (FORMALDEHYDE INACTIVATED), AND INFLUENZA B VIRUS B/MARYLAND/15/2016 BX-69A ANTIGEN (FORMALDEHYDE INACTIVATED) 0.5 ML: 15; 15; 15; 15 INJECTION, SUSPENSION INTRAMUSCULAR at 10:11

## 2018-11-17 RX ADMIN — IPRATROPIUM BROMIDE AND ALBUTEROL SULFATE 3 ML: .5; 3 SOLUTION RESPIRATORY (INHALATION) at 07:11

## 2018-11-17 RX ADMIN — ALPRAZOLAM 0.25 MG: 0.25 TABLET ORAL at 08:11

## 2018-11-17 NOTE — PROGRESS NOTES
Staff Handoff  Received Patient from Nurse Reardon in stable condition, alert and oriented x 4. Complaint of throat pain. Anterior lungs clear/diminished. Upper Posterior lungs markedly diminished . Middle posterior bilateral lung mild wheezing.   Posterior bilateral lower lungs clear on auscultation. Patient remains 95% on 4L nasal cannula. Observed Patient ambulating to bathroom without external oxygen. Installed O2 extender.Urinary output 950 in toilet hat, nuvia clear in characteristics. Left ac IV clean, dry, intact and flushes w/o resistance. Bowel sounds hypoactive. Pulses intact. Normal sinus rhythm. Bed on lock position and call bell within reach.

## 2018-11-17 NOTE — SUBJECTIVE & OBJECTIVE
Past Medical History:   Diagnosis Date    Anxiety        Past Surgical History:   Procedure Laterality Date    APPENDECTOMY      APPENDECTOMY-LAPAROSCOPIC N/A 2017    Performed by Gary Almaguer MD at [facility] OR     SECTION      CHOLECYSTECTOMY         Review of patient's allergies indicates:   Allergen Reactions    Tramadol Rash       No current facility-administered medications on file prior to encounter.      Current Outpatient Medications on File Prior to Encounter   Medication Sig    albuterol-ipratropium (DUO-NEB) 2.5 mg-0.5 mg/3 mL nebulizer solution Take 3 mLs by nebulization every 6 (six) hours while awake rescue    cetirizine (ZYRTEC) 10 MG tablet Take 10 mg by mouth once daily.    ondansetron (ZOFRAN-ODT) 4 MG TbDL Take 1 tablet (4 mg total) by mouth every 8 (eight) hours as needed (nausea).    predniSONE (DELTASONE) 20 MG tablet Take 3 tablets by mouth for 4 days then decrease to 2 tablets by mouth for 4 days then decrease to 1 tablet by mouth for 4 days then 1/2 tablet by mouth for 4 days    SYMBICORT 160-4.5 mcg/actuation HFAA INHALE 2 PUFFS INTO THE LUNGS EVERY 12 HOURS CONTROLLER    triamcinolone acetonide 0.1% (KENALOG) 0.1 % cream Apply topically 2 (two) times daily. Apply to each ear canal bid     Family History     Problem Relation (Age of Onset)    Hypertension Father        Tobacco Use    Smoking status: Current Every Day Smoker     Packs/day: 1.00     Years: 1.00     Pack years: 1.00     Types: Cigarettes     Start date: 2017    Smokeless tobacco: Never Used   Substance and Sexual Activity    Alcohol use: Yes     Comment: occ    Drug use: No    Sexual activity: Yes     Partners: Male     Review of Systems   Constitutional: Negative for fatigue and fever.   HENT: Positive for congestion. Negative for postnasal drip, rhinorrhea, sinus pressure and sneezing.    Eyes: Negative for itching.   Respiratory: Positive for cough, shortness of breath and wheezing.     Cardiovascular: Negative.  Negative for chest pain and palpitations.   Gastrointestinal: Negative.  Negative for diarrhea, nausea and vomiting.   Genitourinary: Negative.    Musculoskeletal: Negative.    Skin: Negative.    Neurological: Negative for headaches.   Psychiatric/Behavioral: Negative.  Negative for sleep disturbance. The patient is not nervous/anxious.      Objective:     Vital Signs (Most Recent):  Temp: 97.3 °F (36.3 °C) (11/17/18 0732)  Pulse: 88 (11/17/18 0732)  Resp: 17 (11/17/18 0710)  BP: (!) 142/83 (11/17/18 0732)  SpO2: (!) 90 % (11/17/18 0848) Vital Signs (24h Range):  Temp:  [96.5 °F (35.8 °C)-97.3 °F (36.3 °C)] 97.3 °F (36.3 °C)  Pulse:  [] 88  Resp:  [17-23] 17  SpO2:  [89 %-97 %] 90 %  BP: (129-143)/(58-83) 142/83     Weight: (!) 138.1 kg (304 lb 7.3 oz)  Body mass index is 47.68 kg/m².    Physical Exam   Constitutional: She is oriented to person, place, and time. She appears well-developed and well-nourished. No distress.   overweight   HENT:   Head: Normocephalic.   Right Ear: Tympanic membrane and ear canal normal.   Left Ear: Tympanic membrane and ear canal normal.   Nose: Nose normal.   Mouth/Throat: Uvula is midline, oropharynx is clear and moist and mucous membranes are normal. Tonsils are 0 on the right. Tonsils are 0 on the left.   Eyes: Pupils are equal, round, and reactive to light. Right eye exhibits no discharge.   Neck: Normal range of motion. Neck supple. No JVD present. No thyromegaly present.   Cardiovascular: Normal rate, regular rhythm, normal heart sounds and intact distal pulses. Exam reveals no gallop and no friction rub.   No murmur heard.  tachycardic   Pulmonary/Chest: Effort normal. She has wheezes. She has rales. She exhibits no tenderness.   Wheezes much improved   Abdominal: Soft. There is no tenderness.   Musculoskeletal: Normal range of motion. She exhibits no edema or tenderness.   Lymphadenopathy:     She has no cervical adenopathy.   Neurological:  She is alert and oriented to person, place, and time. No cranial nerve deficit.   Skin: Skin is dry. No rash noted.   Psychiatric: She has a normal mood and affect. Her behavior is normal. Judgment and thought content normal.   Vitals reviewed.        CRANIAL NERVES     CN III, IV, VI   Pupils are equal, round, and reactive to light.       Significant Labs:   ABGs:   Recent Labs   Lab 11/15/18  1843   PH 7.43   PCO2 38   HCO3 25.20   POCSATURATED 91.5   BE 1.00   TOTALHB 12.7   COHB 1.0   METHB 0.8     Blood Culture:   Recent Labs   Lab 11/15/18  1727 11/15/18  1728   LABBLOO No Growth to date  No Growth to date No Growth to date  No Growth to date     CBC:   Recent Labs   Lab 11/15/18  1727 11/16/18  0416 11/17/18  0555   WBC 10.34 19.94* 24.25*   HGB 13.1 13.3 12.7   HCT 39.4 39.1 37.6    298 318     CMP:   Recent Labs   Lab 11/15/18  1727 11/16/18  0416 11/17/18  0555    140 141   K 3.6 4.4 4.5    106 108   CO2 24 24 23   * 162* 171*   BUN 10 12 14   CREATININE 0.9 0.8 0.7   CALCIUM 9.5 10.0 9.7   PROT 7.3 7.4  --    ALBUMIN 4.1 4.1  --    BILITOT 0.5 0.7  --    ALKPHOS 69 68  --    AST 27 39  --    ALT 23 34  --    ANIONGAP 9 10 10   EGFRNONAA >60 >60 >60     Cardiac Markers:   Recent Labs   Lab 11/15/18  1727   BNP 31     Lactic Acid:   Recent Labs   Lab 11/15/18  1727 11/15/18  2133 11/16/18  0056   LACTATE 2.6* 2.6* 3.3*     Lipase: No results for input(s): LIPASE in the last 48 hours.  Lipid Panel: No results for input(s): CHOL, HDL, LDLCALC, TRIG, CHOLHDL in the last 48 hours.  Magnesium: No results for input(s): MG in the last 48 hours.  POCT Glucose: No results for input(s): POCTGLUCOSE in the last 48 hours.  Respiratory Culture: No results for input(s): GSRESP, RESPIRATORYC in the last 48 hours.  Troponin:   Recent Labs   Lab 11/15/18  1727   TROPONINI <0.006     TSH: No results for input(s): TSH in the last 4320 hours.  Urine Culture: No results for input(s): LABURIN in the  last 48 hours.  Urine Studies: No results for input(s): COLORU, APPEARANCEUA, PHUR, SPECGRAV, PROTEINUA, GLUCUA, KETONESU, BILIRUBINUA, OCCULTUA, NITRITE, UROBILINOGEN, LEUKOCYTESUR, RBCUA, WBCUA, BACTERIA, SQUAMEPITHEL, HYALINECASTS in the last 48 hours.    Invalid input(s): WRIGHTSUR    Significant Imaging: CXR: I have reviewed all pertinent results/findings within the past 24 hours and my personal findings are:  see below   11/15 CXR   The lungs are clear. The cardiac silhouette is within normal limits. No pleural effusion or pneumothorax

## 2018-11-17 NOTE — PLAN OF CARE
Problem: Patient Care Overview  Goal: Plan of Care Review  Outcome: Ongoing (interventions implemented as appropriate)  Patient up ad maryam. PO 92% on RA. Smoking cessation handouts given. No resp distress. Agrees with plan of care.

## 2018-11-17 NOTE — ASSESSMENT & PLAN NOTE
Nicotine patch  Start Wellbutrin  mg p.o. daily  Discssed OP smoking cessation program   Long discussion regarding smoking cessation; counseled >10m

## 2018-11-17 NOTE — ASSESSMENT & PLAN NOTE
Discharge home  Restart home Symbicort 2 puffs BID  Ventolin inhaler 2 puffs p.r.n. wheezing, shortness of breath.  Levaquin 500 mg daily for 5 more days  Follow-up with PCP this week

## 2018-11-17 NOTE — DISCHARGE SUMMARY
[facility] Hospital Medicine  Discharge Summary      Patient Name: Carmencita Bryson  MRN: [id number]  Admission Date: 11/15/2018  Hospital Length of Stay: 0 days  Discharge Date and Time:  11/17/2018 9:25 AM  Attending Physician: Mike Maldonado MD   Discharging Provider: Mike Maldonado MD  Primary Care Provider: Jean Pierre Chappell MD      HPI:   37 YO WF presented to the emergency room last PM with profuse wheezing;  90% oxygenation on ER triage, c/o SOB and cough.    Patient has long history respiratory issues for last 6 months, heavy smoking HX  Patient has a dry hacking cough, was recently hospitalized in September 2018  Patient was also then seen later that month with bronchitis, related to her smoking    CXR -The lungs are clear. The cardiac silhouette is within normal limits. No pleural effusion or pneumothorax  Noted to have ^Lactic acid 2.6>3.3   D-dimer 0.21  C/o headache this am; requesting cough and Rx, xanax 0.25mg bid;   states she is feeling much better but still with some SOB. O2 sat 90%, 2LNC, C/O sore throat- add analgesic spray    * No surgery found *      Hospital Course:   Patient doing much better.  Her peak flow was 440.  Her oxygen saturation is 92% on room air.  She agrees to quit smoking.       Consults:   Consults (From admission, onward)        Status Ordering Provider     Inpatient consult to Pulmonology  Once     Provider:  Vineet Toledo MD    Completed INOCENCIO HAM          * Acute bronchitis due to other specified organisms    Discharge home  Restart home Symbicort 2 puffs BID  Ventolin inhaler 2 puffs p.r.n. wheezing, shortness of breath.  Levaquin 500 mg daily for 5 more days  Follow-up with PCP this week       Anxiety    Continue home xanax 0.25mg bid  Verified per [facility]- Rx per Dr. Chappell        Smoker    Nicotine patch  Start Wellbutrin  mg p.o. daily  Discssed OP smoking cessation program   Long discussion regarding smoking cessation; counseled  >10m      Asthma    Prednisone 10 mg Take 4 po q day x 4 days, then 3 po q day x 3 days, then 2 po q day x 2 days, then 1 po x 1         Final Active Diagnoses:    Diagnosis Date Noted POA    PRINCIPAL PROBLEM:  Acute bronchitis due to other specified organisms [J20.8] 09/25/2018 Yes    Anxiety [F41.9] 09/26/2018 Yes    Smoker [F17.200] 09/25/2018 Yes    Asthma [J45.909] 12/04/2013 Yes      Problems Resolved During this Admission:       Discharged Condition: good    Disposition: Home or Self Care    Follow Up:  Follow-up Information     Jean Pierre Chappell MD. Schedule an appointment as soon as possible for a visit in 3 days.    Specialty:  Internal Medicine  Contact information:  [facility] [place] [address] [phone]                 Patient Instructions:      [50167] AK TOBACCO USE CESSATION INTENSIVE >10 MIN       Significant Diagnostic Studies:  Oxygen saturation is 92% on room air    Pending Diagnostic Studies:     None         Medications:  Reconciled Home Medications:      Medication List      START taking these medications    buPROPion 150 MG TB24 tablet  Commonly known as:  WELLBUTRIN XL  Take 1 tablet (150 mg total) by mouth once daily.     levoFLOXacin 500 MG tablet  Commonly known as:  LEVAQUIN  Take 1 tablet (500 mg total) by mouth once daily. for 7 days     methylPREDNISolone 4 mg tablet  Commonly known as:  MEDROL DOSEPACK  Pack as directed     nicotine 14 mg/24 hr  Commonly known as:  NICODERM CQ  Place 1 patch onto the skin daily as needed (cigarette craving).     promethazine-dextromethorphan 6.25-15 mg/5 mL Syrp  Commonly known as:  PROMETHAZINE-DM  Take 5 mLs by mouth every 6 (six) hours as needed (cough).        CHANGE how you take these medications    * albuterol 90 mcg/actuation inhaler  Commonly known as:  PROVENTIL/VENTOLIN HFA  Inhale 1-2 puffs into the lungs every 6 (six) hours as needed for Wheezing (sob).  What changed:    · reasons to take this  · additional instructions     *  albuterol 2.5 mg /3 mL (0.083 %) nebulizer solution  Commonly known as:  PROVENTIL  Take 3 mLs (2.5 mg total) by nebulization every 6 (six) hours as needed for Wheezing.  What changed:  You were already taking a medication with the same name, and this prescription was added. Make sure you understand how and when to take each.     * albuterol 90 mcg/actuation inhaler  Commonly known as:  PROVENTIL/VENTOLIN HFA  Inhale 2 puffs into the lungs every 4 (four) hours as needed for Wheezing or Shortness of Breath.  What changed:  You were already taking a medication with the same name, and this prescription was added. Make sure you understand how and when to take each.     predniSONE 10 MG tablet  Commonly known as:  DELTASONE  Take 4 po q day x 4 days, then 3 po q day x 3 days, then 2 po q day x 2 days, then 1 po x 1  What changed:    · medication strength  · additional instructions         * This list has 3 medication(s) that are the same as other medications prescribed for you. Read the directions carefully, and ask your doctor or other care provider to review them with you.            CONTINUE taking these medications    albuterol-ipratropium 2.5 mg-0.5 mg/3 mL nebulizer solution  Commonly known as:  DUO-NEB  Take 3 mLs by nebulization every 6 (six) hours while awake rescue     ondansetron 4 MG Tbdl  Commonly known as:  ZOFRAN-ODT  Take 1 tablet (4 mg total) by mouth every 8 (eight) hours as needed (nausea).     SYMBICORT 160-4.5 mcg/actuation Hfaa  Generic drug:  budesonide-formoterol 160-4.5 mcg  INHALE 2 PUFFS INTO THE LUNGS EVERY 12 HOURS CONTROLLER     triamcinolone acetonide 0.1% 0.1 % cream  Commonly known as:  KENALOG  Apply topically 2 (two) times daily. Apply to each ear canal bid        STOP taking these medications    cetirizine 10 MG tablet  Commonly known as:  ZYRTEC            Indwelling Lines/Drains at time of discharge:   Lines/Drains/Airways          None          Time spent on the discharge of  patient: 25 minutes  Patient was seen and examined on the date of discharge and determined to be suitable for discharge.         Mike Maldonado MD  Department of Hospital Medicine  [facility]

## 2018-11-17 NOTE — PROGRESS NOTES
[facility] Medicine  Progress Note    Patient Name: Carmencita Bryson  MRN: [id number]  Patient Class: OP- Observation   Admission Date: 11/15/2018  Length of Stay: 0 days  Attending Physician: Mike Maldonado MD  Primary Care Provider: Jean Pierre Chappell MD        Subjective:     Principal Problem:Acute bronchitis due to other specified organisms    HPI:  39 YO WF presented to the emergency room last PM with profuse wheezing;  90% oxygenation on ER triage, c/o SOB and cough.    Patient has long history respiratory issues for last 6 months, heavy smoking HX  Patient has a dry hacking cough, was recently hospitalized in 2018  Patient was also then seen later that month with bronchitis, related to her smoking    CXR -The lungs are clear. The cardiac silhouette is within normal limits. No pleural effusion or pneumothorax  Noted to have ^Lactic acid 2.6>3.3   D-dimer 0.21  C/o headache this am; requesting cough and Rx, xanax 0.25mg bid;   states she is feeling much better but still with some SOB. O2 sat 90%, 2LNC, C/O sore throat- add analgesic spray    Hospital Course:  Patient doing much better.  Her peak flow was 440.  Her oxygen saturation is 92% on room air.  She agrees to quit smoking.      Past Medical History:   Diagnosis Date    Anxiety        Past Surgical History:   Procedure Laterality Date    APPENDECTOMY      APPENDECTOMY-LAPAROSCOPIC N/A 2017    Performed by Gary Almaguer MD at [facility] OR     SECTION      CHOLECYSTECTOMY         Review of patient's allergies indicates:   Allergen Reactions    Tramadol Rash       No current facility-administered medications on file prior to encounter.      Current Outpatient Medications on File Prior to Encounter   Medication Sig    albuterol-ipratropium (DUO-NEB) 2.5 mg-0.5 mg/3 mL nebulizer solution Take 3 mLs by nebulization every 6 (six) hours while awake rescue    cetirizine (ZYRTEC) 10 MG tablet Take 10 mg by  mouth once daily.    ondansetron (ZOFRAN-ODT) 4 MG TbDL Take 1 tablet (4 mg total) by mouth every 8 (eight) hours as needed (nausea).    predniSONE (DELTASONE) 20 MG tablet Take 3 tablets by mouth for 4 days then decrease to 2 tablets by mouth for 4 days then decrease to 1 tablet by mouth for 4 days then 1/2 tablet by mouth for 4 days    SYMBICORT 160-4.5 mcg/actuation HFAA INHALE 2 PUFFS INTO THE LUNGS EVERY 12 HOURS CONTROLLER    triamcinolone acetonide 0.1% (KENALOG) 0.1 % cream Apply topically 2 (two) times daily. Apply to each ear canal bid     Family History     Problem Relation (Age of Onset)    Hypertension Father        Tobacco Use    Smoking status: Current Every Day Smoker     Packs/day: 1.00     Years: 1.00     Pack years: 1.00     Types: Cigarettes     Start date: 9/25/2017    Smokeless tobacco: Never Used   Substance and Sexual Activity    Alcohol use: Yes     Comment: occ    Drug use: No    Sexual activity: Yes     Partners: Male     Review of Systems   Constitutional: Negative for fatigue and fever.   HENT: Positive for congestion. Negative for postnasal drip, rhinorrhea, sinus pressure and sneezing.    Eyes: Negative for itching.   Respiratory: Positive for cough, shortness of breath and wheezing.    Cardiovascular: Negative.  Negative for chest pain and palpitations.   Gastrointestinal: Negative.  Negative for diarrhea, nausea and vomiting.   Genitourinary: Negative.    Musculoskeletal: Negative.    Skin: Negative.    Neurological: Negative for headaches.   Psychiatric/Behavioral: Negative.  Negative for sleep disturbance. The patient is not nervous/anxious.      Objective:     Vital Signs (Most Recent):  Temp: 97.3 °F (36.3 °C) (11/17/18 0732)  Pulse: 88 (11/17/18 0732)  Resp: 17 (11/17/18 0710)  BP: (!) 142/83 (11/17/18 0732)  SpO2: (!) 90 % (11/17/18 0848) Vital Signs (24h Range):  Temp:  [96.5 °F (35.8 °C)-97.3 °F (36.3 °C)] 97.3 °F (36.3 °C)  Pulse:  [] 88  Resp:  [17-23]  17  SpO2:  [89 %-97 %] 90 %  BP: (129-143)/(58-83) 142/83     Weight: (!) 138.1 kg (304 lb 7.3 oz)  Body mass index is 47.68 kg/m².    Physical Exam   Constitutional: She is oriented to person, place, and time. She appears well-developed and well-nourished. No distress.   overweight   HENT:   Head: Normocephalic.   Right Ear: Tympanic membrane and ear canal normal.   Left Ear: Tympanic membrane and ear canal normal.   Nose: Nose normal.   Mouth/Throat: Uvula is midline, oropharynx is clear and moist and mucous membranes are normal. Tonsils are 0 on the right. Tonsils are 0 on the left.   Eyes: Pupils are equal, round, and reactive to light. Right eye exhibits no discharge.   Neck: Normal range of motion. Neck supple. No JVD present. No thyromegaly present.   Cardiovascular: Normal rate, regular rhythm, normal heart sounds and intact distal pulses. Exam reveals no gallop and no friction rub.   No murmur heard.  tachycardic   Pulmonary/Chest: Effort normal. She has wheezes. She has rales. She exhibits no tenderness.   Wheezes much improved   Abdominal: Soft. There is no tenderness.   Musculoskeletal: Normal range of motion. She exhibits no edema or tenderness.   Lymphadenopathy:     She has no cervical adenopathy.   Neurological: She is alert and oriented to person, place, and time. No cranial nerve deficit.   Skin: Skin is dry. No rash noted.   Psychiatric: She has a normal mood and affect. Her behavior is normal. Judgment and thought content normal.   Vitals reviewed.        CRANIAL NERVES     CN III, IV, VI   Pupils are equal, round, and reactive to light.       Significant Labs:   ABGs:   Recent Labs   Lab 11/15/18  1843   PH 7.43   PCO2 38   HCO3 25.20   POCSATURATED 91.5   BE 1.00   TOTALHB 12.7   COHB 1.0   METHB 0.8     Blood Culture:   Recent Labs   Lab 11/15/18  1727 11/15/18  1728   LABBLOO No Growth to date  No Growth to date No Growth to date  No Growth to date     CBC:   Recent Labs   Lab  11/15/18  1727 11/16/18  0416 11/17/18  0555   WBC 10.34 19.94* 24.25*   HGB 13.1 13.3 12.7   HCT 39.4 39.1 37.6    298 318     CMP:   Recent Labs   Lab 11/15/18  1727 11/16/18  0416 11/17/18  0555    140 141   K 3.6 4.4 4.5    106 108   CO2 24 24 23   * 162* 171*   BUN 10 12 14   CREATININE 0.9 0.8 0.7   CALCIUM 9.5 10.0 9.7   PROT 7.3 7.4  --    ALBUMIN 4.1 4.1  --    BILITOT 0.5 0.7  --    ALKPHOS 69 68  --    AST 27 39  --    ALT 23 34  --    ANIONGAP 9 10 10   EGFRNONAA >60 >60 >60     Cardiac Markers:   Recent Labs   Lab 11/15/18  1727   BNP 31     Lactic Acid:   Recent Labs   Lab 11/15/18  1727 11/15/18  2133 11/16/18  0056   LACTATE 2.6* 2.6* 3.3*     Lipase: No results for input(s): LIPASE in the last 48 hours.  Lipid Panel: No results for input(s): CHOL, HDL, LDLCALC, TRIG, CHOLHDL in the last 48 hours.  Magnesium: No results for input(s): MG in the last 48 hours.  POCT Glucose: No results for input(s): POCTGLUCOSE in the last 48 hours.  Respiratory Culture: No results for input(s): GSRESP, RESPIRATORYC in the last 48 hours.  Troponin:   Recent Labs   Lab 11/15/18  1727   TROPONINI <0.006     TSH: No results for input(s): TSH in the last 4320 hours.  Urine Culture: No results for input(s): LABURIN in the last 48 hours.  Urine Studies: No results for input(s): COLORU, APPEARANCEUA, PHUR, SPECGRAV, PROTEINUA, GLUCUA, KETONESU, BILIRUBINUA, OCCULTUA, NITRITE, UROBILINOGEN, LEUKOCYTESUR, RBCUA, WBCUA, BACTERIA, SQUAMEPITHEL, HYALINECASTS in the last 48 hours.    Invalid input(s): WRIGHTSUR    Significant Imaging: CXR: I have reviewed all pertinent results/findings within the past 24 hours and my personal findings are:  see below   11/15 CXR   The lungs are clear. The cardiac silhouette is within normal limits. No pleural effusion or pneumothorax    Assessment/Plan:      * Acute bronchitis due to other specified organisms    Discharge home  Continue once daily Breo  Ventolin inhaler 2  puffs p.r.n. wheezing, shortness of breath.  Levaquin 500 mg daily for 5 more days  Follow-up with PCP this week       Anxiety    Continue home xanax 0.25mg bid  Verified per [facility]- Rx per Dr. Chappell        Smoker    Nicotine patch  Start Wellbutrin  mg p.o. daily  Discssed OP smoking cessation program   Long discussion regarding smoking cessation; counseled >10m      Asthma    Prednisone 10 mg Take 4 po q day x 4 days, then 3 po q day x 3 days, then 2 po q day x 2 days, then 1 po x 1         VTE Risk Mitigation (From admission, onward)        Ordered     IP VTE HIGH RISK PATIENT  Once      11/15/18 2146     Place sequential compression device  Until discontinued      11/15/18 2146              Mike Maldonado MD  Department of Hospital Medicine   [facility]

## 2018-11-17 NOTE — ASSESSMENT & PLAN NOTE
Prednisone 10 mg Take 4 po q day x 4 days, then 3 po q day x 3 days, then 2 po q day x 2 days, then 1 po x 1

## 2018-11-17 NOTE — ASSESSMENT & PLAN NOTE
Discharge home  Continue once daily Breo  Ventolin inhaler 2 puffs p.r.n. wheezing, shortness of breath.  Levaquin 500 mg daily for 5 more days  Follow-up with PCP this week

## 2018-11-17 NOTE — HOSPITAL COURSE
Patient doing much better.  Her peak flow was 440.  Her oxygen saturation is 92% on room air.  She agrees to quit smoking.

## 2018-11-19 NOTE — PLAN OF CARE
11/19/18 0751   Final Note   Assessment Type Final Discharge Note   Anticipated Discharge Disposition Home   What phone number can be called within the next 1-3 days to see how you are doing after discharge? [phone]   Hospital Follow Up  Appt(s) scheduled? Yes   Discharge plans and expectations educations in teach back method with documentation complete? Yes   Right Care Referral Info   Post Acute Recommendation No Care

## 2018-11-20 LAB
BACTERIA BLD CULT: NORMAL
BACTERIA BLD CULT: NORMAL

## 2018-12-14 RX ORDER — BUPROPION HYDROCHLORIDE 150 MG/1
TABLET ORAL
Qty: 30 TABLET | Refills: 0 | OUTPATIENT
Start: 2018-12-14

## 2019-01-14 RX ORDER — BUPROPION HYDROCHLORIDE 150 MG/1
TABLET ORAL
Qty: 30 TABLET | Refills: 0 | Status: SHIPPED | OUTPATIENT
Start: 2019-01-14

## 2020-07-07 ENCOUNTER — OFFICE VISIT (OUTPATIENT)
Dept: URGENT CARE | Facility: CLINIC | Age: 40
End: 2020-07-07
Payer: MEDICAID

## 2020-07-07 VITALS
RESPIRATION RATE: 16 BRPM | OXYGEN SATURATION: 98 % | DIASTOLIC BLOOD PRESSURE: 55 MMHG | TEMPERATURE: 96 F | BODY MASS INDEX: 45.99 KG/M2 | WEIGHT: 293 LBS | HEIGHT: 67 IN | SYSTOLIC BLOOD PRESSURE: 117 MMHG | HEART RATE: 72 BPM

## 2020-07-07 DIAGNOSIS — M54.50 BILATERAL LOW BACK PAIN WITHOUT SCIATICA, UNSPECIFIED CHRONICITY: Primary | ICD-10-CM

## 2020-07-07 LAB
BILIRUB UR QL STRIP: POSITIVE
GLUCOSE UR QL STRIP: NEGATIVE
KETONES UR QL STRIP: NEGATIVE
LEUKOCYTE ESTERASE UR QL STRIP: NEGATIVE
PH, POC UA: 5 (ref 5–8)
POC BLOOD, URINE: NEGATIVE
POC NITRATES, URINE: NEGATIVE
PROT UR QL STRIP: NEGATIVE
SP GR UR STRIP: 1.02 (ref 1–1.03)
UROBILINOGEN UR STRIP-ACNC: NORMAL (ref 0.1–1.1)

## 2020-07-07 PROCEDURE — 99203 OFFICE O/P NEW LOW 30 MIN: CPT | Mod: 25,S$GLB,, | Performed by: NURSE PRACTITIONER

## 2020-07-07 PROCEDURE — 81003 URINALYSIS AUTO W/O SCOPE: CPT | Mod: QW,S$GLB,, | Performed by: NURSE PRACTITIONER

## 2020-07-07 PROCEDURE — 99203 PR OFFICE/OUTPT VISIT, NEW, LEVL III, 30-44 MIN: ICD-10-PCS | Mod: 25,S$GLB,, | Performed by: NURSE PRACTITIONER

## 2020-07-07 PROCEDURE — 81003 POCT URINALYSIS, DIPSTICK, AUTOMATED, W/O SCOPE: ICD-10-PCS | Mod: QW,S$GLB,, | Performed by: NURSE PRACTITIONER

## 2020-07-07 RX ORDER — KETOROLAC TROMETHAMINE 30 MG/ML
30 INJECTION, SOLUTION INTRAMUSCULAR; INTRAVENOUS
Status: DISCONTINUED | OUTPATIENT
Start: 2020-07-07 | End: 2020-07-07

## 2020-07-07 RX ORDER — ALBUTEROL SULFATE 90 UG/1
2 AEROSOL, METERED RESPIRATORY (INHALATION) EVERY 6 HOURS PRN
Qty: 8 G | Refills: 0 | Status: SHIPPED | OUTPATIENT
Start: 2020-07-07 | End: 2021-07-18 | Stop reason: SDUPTHER

## 2020-07-07 RX ORDER — KETOROLAC TROMETHAMINE 30 MG/ML
30 INJECTION, SOLUTION INTRAMUSCULAR; INTRAVENOUS
Status: COMPLETED | OUTPATIENT
Start: 2020-07-07 | End: 2020-07-07

## 2020-07-07 RX ORDER — DICLOFENAC SODIUM 75 MG/1
75 TABLET, DELAYED RELEASE ORAL 2 TIMES DAILY
Qty: 20 TABLET | Refills: 0 | Status: SHIPPED | OUTPATIENT
Start: 2020-07-07 | End: 2020-07-17

## 2020-07-07 RX ADMIN — KETOROLAC TROMETHAMINE 30 MG: 30 INJECTION, SOLUTION INTRAMUSCULAR; INTRAVENOUS at 10:07

## 2020-07-07 NOTE — PATIENT INSTRUCTIONS
BACK PAIN      Begin taking Diclofenac tomorrow, 07/08/2020/     Alternate heat and ice for first 48 hours then  apply heat. You may do gently stretching if tolerable.    Moist warm compresss to area several times daily.  May use a heating pad on LOW to provide heat over a towel which was dampended with warm water. DO NOT FALL ASLEEP WITH HEATING PAD ON.  Do not stay in one position to long.  When sleeping on your back place a pillow under knees to reduce tension on back.  If sleeping on your side, place pillow between knees to keep spine in better alinement.  Wear supportive shoes such as tennis shoes for support of the lower back.  Take any medication as directed.    If you were not prescribed an anti-inflammatory medication, and if you do not have any history of stomach/intestinal ulcers, or kidney disease, or are not taking a blood thinner such as Coumadin, Plavix, Pradaxa, Eloquis, or Xaralta for example, it is OK to take over the counter Ibuprofen or Advil or Motrin or Aleve as directed.  Do not take these medications on an empty stomach.    If you were prescribed a narcotic medication, do not drive or operate heavy equipment or machinery while taking these medications.    If you lose control of your bowel and/or bladder, please go to the nearest Emergency Department immediately.  If you lose sensation in between your legs by your genitalia and/or rectum, please go to the nearest Emergency Department immediately.  If you lose control or sensation of any extremity, please go to the nearest Emergency Department immediately.

## 2020-07-07 NOTE — PROGRESS NOTES
"See Subjective:       Patient ID: Carmencita Bryson is a 40 y.o. female.    Vitals:  height is 5' 7" (1.702 m) and weight is 138.3 kg (305 lb) (abnormal). Her oral temperature is 96.4 °F (35.8 °C). Her blood pressure is 117/55 (abnormal) and her pulse is 72. Her respiration is 16 and oxygen saturation is 98%.     Chief Complaint: Back Pain, Medication Refill (albuterol inhaler), and Rash    40 year old reports low back pain, denies injuries and heavy lifting, requesting to have urine checked and refill albuterol inhaler.     Back Pain  This is a recurrent problem. Episode onset: 1.5 weeks. The problem occurs constantly. The problem has been gradually worsening since onset. The pain is present in the lumbar spine. The quality of the pain is described as aching. The pain does not radiate. The pain is at a severity of 9/10. The pain is moderate. The symptoms are aggravated by lying down, sitting, bending and position. Pertinent negatives include no bladder incontinence, chest pain, dysuria or fever. She has tried NSAIDs for the symptoms. The treatment provided no relief.   Medication Refill  This is a new problem. The current episode started today. The problem has been unchanged. Pertinent negatives include no arthralgias, chest pain, chills, coughing, fever, joint swelling, rash or sore throat. Nothing aggravates the symptoms. She has tried nothing for the symptoms.   Rash  This is a new problem. Episode onset: 2-3 weeks. The problem has been gradually worsening since onset. The affected locations include the right arm, abdomen, right upper leg and right buttock. The rash is characterized by itchiness, dryness and redness. It is unknown if there was an exposure to a precipitant. Pertinent negatives include no cough, fever, shortness of breath or sore throat. Past treatments include nothing.       Constitution: Negative for chills and fever.   HENT: Negative for facial swelling and sore throat.    Neck: Negative for " painful lymph nodes.   Cardiovascular: Negative for chest pain, leg swelling, palpitations and sob on exertion.   Eyes: Negative for eye itching and eyelid swelling.   Respiratory: Negative for chest tightness, cough, sputum production, bloody sputum, COPD, shortness of breath, stridor and wheezing.    Genitourinary: Negative for dysuria, frequency, urgency, urine decreased, flank pain and bladder incontinence.   Musculoskeletal: Positive for back pain. Negative for joint pain and joint swelling.   Skin: Negative for color change, pale, rash, wound, abrasion, laceration, lesion, skin thickening/induration, puncture wound, erythema, bruising, abscess, avulsion and hives.   Allergic/Immunologic: Negative for environmental allergies, immunocompromised state and hives.   Hematologic/Lymphatic: Negative for swollen lymph nodes.       Objective:      Physical Exam   Constitutional: She is oriented to person, place, and time. She appears well-developed. She is cooperative. No distress.   HENT:   Head: Normocephalic and atraumatic.   Nose: Nose normal.   Mouth/Throat: Oropharynx is clear and moist and mucous membranes are normal.   Eyes: Conjunctivae and lids are normal.   Neck: Trachea normal, normal range of motion, full passive range of motion without pain and phonation normal. Neck supple.   Cardiovascular: Normal rate, regular rhythm, normal heart sounds and normal pulses.   Pulmonary/Chest: Effort normal and breath sounds normal. No stridor. No respiratory distress. She has no wheezes. She has no rhonchi. She has no rales. She exhibits no tenderness.   Abdominal: Soft. Normal appearance and bowel sounds are normal. She exhibits no abdominal bruit, no pulsatile midline mass and no mass.   Musculoskeletal:         General: No deformity.      Lumbar back: She exhibits pain. She exhibits normal range of motion, no tenderness, no bony tenderness, no swelling, no edema, no deformity, no laceration, no spasm and normal  pulse.        Back:    Neurological: She is alert and oriented to person, place, and time. She has normal strength and normal reflexes. No sensory deficit.   Skin: Skin is warm, dry, intact and not diaphoretic. erythema  Psychiatric: Her speech is normal and behavior is normal. Judgment and thought content normal.   Nursing note and vitals reviewed.        Assessment:       1. Bilateral low back pain without sciatica, unspecified chronicity        Plan:         Bilateral low back pain without sciatica, unspecified chronicity  -     POCT Urinalysis, Dipstick, Automated, W/O Scope  -     diclofenac (VOLTAREN) 75 MG EC tablet; Take 1 tablet (75 mg total) by mouth 2 (two) times daily. for 10 days  Dispense: 20 tablet; Refill: 0  -     ketorolac injection 30 mg    Other orders  -     Discontinue: ketorolac injection 30 mg  -     albuterol (PROVENTIL/VENTOLIN HFA) 90 mcg/actuation inhaler; Inhale 2 puffs into the lungs every 6 (six) hours as needed for Wheezing or Shortness of Breath. Rescue  Dispense: 8 g; Refill: 0          Results for orders placed or performed in visit on 07/07/20   POCT Urinalysis, Dipstick, Automated, W/O Scope   Result Value Ref Range    POC Blood, Urine Negative Negative    POC Bilirubin, Urine Positive (A) Negative    POC Urobilinogen, Urine Normal 0.1 - 1.1    POC Ketones, Urine Negative Negative    POC Protein, Urine Negative Negative    POC Nitrates, Urine Negative Negative    POC Glucose, Urine Negative Negative    pH, UA 5.0 5 - 8    POC Specific Gravity, Urine 1.020 1.003 - 1.029    POC Leukocytes, Urine Negative Negative    Is having    Patient Instructions   BACK PAIN      Begin taking Diclofenac tomorrow, 07/08/2020/     Alternate heat and ice for first 48 hours then  apply heat. You may do gently stretching if tolerable.    Moist warm compresss to area several times daily.  May use a heating pad on LOW to provide heat over a towel which was dampended with warm water. DO NOT FALL ASLEEP  WITH HEATING PAD ON.  Do not stay in one position to long.  When sleeping on your back place a pillow under knees to reduce tension on back.  If sleeping on your side, place pillow between knees to keep spine in better alinement.  Wear supportive shoes such as tennis shoes for support of the lower back.  Take any medication as directed.    If you were not prescribed an anti-inflammatory medication, and if you do not have any history of stomach/intestinal ulcers, or kidney disease, or are not taking a blood thinner such as Coumadin, Plavix, Pradaxa, Eloquis, or Xaralta for example, it is OK to take over the counter Ibuprofen or Advil or Motrin or Aleve as directed.  Do not take these medications on an empty stomach.    If you were prescribed a narcotic medication, do not drive or operate heavy equipment or machinery while taking these medications.    If you lose control of your bowel and/or bladder, please go to the nearest Emergency Department immediately.  If you lose sensation in between your legs by your genitalia and/or rectum, please go to the nearest Emergency Department immediately.  If you lose control or sensation of any extremity, please go to the nearest Emergency Department immediately.

## 2020-07-18 DIAGNOSIS — U07.1 COVID-19 VIRUS DETECTED: ICD-10-CM

## 2020-11-11 ENCOUNTER — OFFICE VISIT (OUTPATIENT)
Dept: URGENT CARE | Facility: CLINIC | Age: 40
End: 2020-11-11
Payer: MEDICAID

## 2020-11-11 VITALS
TEMPERATURE: 97 F | SYSTOLIC BLOOD PRESSURE: 134 MMHG | WEIGHT: 293 LBS | BODY MASS INDEX: 45.99 KG/M2 | DIASTOLIC BLOOD PRESSURE: 90 MMHG | HEIGHT: 67 IN | OXYGEN SATURATION: 98 % | RESPIRATION RATE: 16 BRPM | HEART RATE: 99 BPM

## 2020-11-11 DIAGNOSIS — F17.200 SMOKER: ICD-10-CM

## 2020-11-11 DIAGNOSIS — S13.9XXA NECK SPRAIN, INITIAL ENCOUNTER: Primary | ICD-10-CM
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
## 2020-11-11 DIAGNOSIS — S43.401A SPRAIN OF RIGHT SHOULDER, UNSPECIFIED SHOULDER SPRAIN TYPE, INITIAL ENCOUNTER: ICD-10-CM

## 2020-11-11 PROCEDURE — 73030 XR SHOULDER COMPLETE 2 OR MORE VIEWS RIGHT: ICD-10-PCS | Mod: RT,S$GLB,, | Performed by: RADIOLOGY

## 2020-11-11 PROCEDURE — 99214 PR OFFICE/OUTPT VISIT, EST, LEVL IV, 30-39 MIN: ICD-10-PCS | Mod: S$GLB,,, | Performed by: FAMILY MEDICINE

## 2020-11-11 PROCEDURE — 72040 X-RAY EXAM NECK SPINE 2-3 VW: CPT | Mod: S$GLB,,, | Performed by: RADIOLOGY

## 2020-11-11 PROCEDURE — 73030 X-RAY EXAM OF SHOULDER: CPT | Mod: RT,S$GLB,, | Performed by: RADIOLOGY

## 2020-11-11 PROCEDURE — 99214 OFFICE O/P EST MOD 30 MIN: CPT | Mod: S$GLB,,, | Performed by: FAMILY MEDICINE

## 2020-11-11 PROCEDURE — 72040 XR CERVICAL SPINE 2 OR 3 VIEWS: ICD-10-PCS | Mod: S$GLB,,, | Performed by: RADIOLOGY

## 2020-11-11 RX ORDER — KETOROLAC TROMETHAMINE 30 MG/ML
30 INJECTION, SOLUTION INTRAMUSCULAR; INTRAVENOUS ONCE
Status: COMPLETED | OUTPATIENT
Start: 2020-11-11 | End: 2020-11-11

## 2020-11-11 RX ORDER — CHLORZOXAZONE 500 MG/1
500 TABLET ORAL 4 TIMES DAILY PRN
Qty: 40 TABLET | Refills: 0 | Status: SHIPPED | OUTPATIENT
Start: 2020-11-11 | End: 2020-11-21

## 2020-11-11 RX ORDER — NAPROXEN 500 MG/1
500 TABLET ORAL 2 TIMES DAILY WITH MEALS
Qty: 20 TABLET | Refills: 0 | Status: SHIPPED | OUTPATIENT
Start: 2020-11-11 | End: 2020-12-19 | Stop reason: ALTCHOICE

## 2020-11-11 RX ADMIN — KETOROLAC TROMETHAMINE 30 MG: 30 INJECTION, SOLUTION INTRAMUSCULAR; INTRAVENOUS at 12:11
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
## 2020-11-11 NOTE — LETTER
November 11, 2020  Carmencita Bryson  [address] [place] [address]                [facility]  [address], [address]  [place] [address]  Phone: [phone]  Fax: [phone] Carmencita Bryson was seen and treated in our Urgent Care department on 11/11/2020. She may return to work in 2 - 3 days.      If you have any questions or concerns, please don't hesitate to call.        Sincerely,        Jt Greenberg MD

## 2020-12-19 ENCOUNTER — OFFICE VISIT (OUTPATIENT)
Dept: URGENT CARE | Facility: CLINIC | Age: 40
End: 2020-12-19
Payer: MEDICAID

## 2020-12-19 VITALS
OXYGEN SATURATION: 96 % | TEMPERATURE: 98 F | RESPIRATION RATE: 19 BRPM | HEIGHT: 67 IN | HEART RATE: 74 BPM | BODY MASS INDEX: 45.99 KG/M2 | WEIGHT: 293 LBS | SYSTOLIC BLOOD PRESSURE: 128 MMHG | DIASTOLIC BLOOD PRESSURE: 74 MMHG

## 2020-12-19 DIAGNOSIS — J20.9 ACUTE PURULENT BRONCHITIS: ICD-10-CM

## 2020-12-19 DIAGNOSIS — M25.511 ACUTE PAIN OF RIGHT SHOULDER: Primary | ICD-10-CM

## 2020-12-19 PROCEDURE — 99214 PR OFFICE/OUTPT VISIT, EST, LEVL IV, 30-39 MIN: ICD-10-PCS | Mod: S$GLB,,, | Performed by: NURSE PRACTITIONER

## 2020-12-19 PROCEDURE — 99214 OFFICE O/P EST MOD 30 MIN: CPT | Mod: S$GLB,,, | Performed by: NURSE PRACTITIONER

## 2020-12-19 RX ORDER — TIZANIDINE 4 MG/1
4 TABLET ORAL EVERY 6 HOURS PRN
Qty: 10 TABLET | Refills: 0 | Status: SHIPPED | OUTPATIENT
Start: 2020-12-19 | End: 2020-12-19

## 2020-12-19 RX ORDER — NAPROXEN 500 MG/1
500 TABLET ORAL 2 TIMES DAILY WITH MEALS
Qty: 14 TABLET | Refills: 0 | Status: SHIPPED | OUTPATIENT
Start: 2020-12-19 | End: 2020-12-19

## 2020-12-19 RX ORDER — TIZANIDINE 4 MG/1
4 TABLET ORAL EVERY 6 HOURS PRN
Qty: 10 TABLET | Refills: 0 | Status: SHIPPED | OUTPATIENT
Start: 2020-12-19 | End: 2020-12-29

## 2020-12-19 RX ORDER — PREDNISONE 10 MG/1
10 TABLET ORAL DAILY
Qty: 5 TABLET | Refills: 0 | Status: SHIPPED | OUTPATIENT
Start: 2020-12-19 | End: 2020-12-24

## 2020-12-19 RX ORDER — BENZONATATE 200 MG/1
200 CAPSULE ORAL 3 TIMES DAILY PRN
Qty: 30 CAPSULE | Refills: 0 | Status: SHIPPED | OUTPATIENT
Start: 2020-12-19 | End: 2020-12-19

## 2020-12-19 RX ORDER — BENZONATATE 200 MG/1
200 CAPSULE ORAL 3 TIMES DAILY PRN
Qty: 30 CAPSULE | Refills: 0 | Status: SHIPPED | OUTPATIENT
Start: 2020-12-19

## 2020-12-19 RX ORDER — PREDNISONE 10 MG/1
10 TABLET ORAL DAILY
Qty: 5 TABLET | Refills: 0 | Status: SHIPPED | OUTPATIENT
Start: 2020-12-19 | End: 2020-12-19

## 2020-12-19 RX ORDER — NAPROXEN 500 MG/1
500 TABLET ORAL 2 TIMES DAILY WITH MEALS
Qty: 14 TABLET | Refills: 0 | Status: SHIPPED | OUTPATIENT
Start: 2020-12-19 | End: 2020-12-26
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
## 2020-12-19 NOTE — PATIENT INSTRUCTIONS
Bronchitis with Wheezing (Viral or Bacterial: Adult)    Bronchitis is an infection of the air passages. It often occurs during a cold and is usually caused by a virus. Symptoms include cough with mucus (phlegm) and low-grade fever. This illness is contagious during the first few days and is spread through the air by coughing and sneezing, or by direct contact (touching the sick person and then touching your own eyes, nose, or mouth).  If there is a lot of inflammation, air flow is restricted. The air passages may also go into spasm, especially if you have asthma. This causes wheezing and difficulty breathing even in people who do not have asthma.  Bronchitis usually lasts 7 to 14 days. The wheezing should improve with treatment during the first week. An inhaler is often prescribed to relax the air passages and stop wheezing. Antibiotics will be prescribed if your doctor thinks there is also a secondary bacterial infection.  Home care  · If symptoms are severe, rest at home for the first 2 to 3 days. When you go back to your usual activities, don't let yourself get too tired.  · Do not smoke. Also avoid being exposed to secondhand smoke.  · You may use over-the-counter medicine to control fever or pain, unless another medicine was prescribed. Note: If you have chronic liver or kidney disease or have ever had a stomach ulcer or gastrointestinal bleeding, talk with your healthcare provider before using these medicines. Also talk to your provider if you are taking medicine to prevent blood clots.) Aspirin should never be given to anyone younger than 18 years of age who is ill with a viral infection or fever. It may cause severe liver or brain damage.  · Your appetite may be poor, so a light diet is fine. Avoid dehydration by drinking 6 to 8 glasses of fluids per day (such as water, soft drinks, sports drinks, juices, tea, or soup). Extra fluids will help loosen secretions in the nose and lungs.  · Over-the-counter  cough, cold, and sore-throat medicines will not shorten the length of the illness, but they may be helpful to reduce symptoms. (Note: Do not use decongestants if you have high blood pressure.)  · If you were given an inhaler, use it exactly as directed. If you need to use it more often than prescribed, your condition may be worsening. If this happens, contact your healthcare provider.  · If prescribed, finish all antibiotic medicine, even if you are feeling better after only a few days.  Follow-up care  Follow up with your healthcare provider, or as advised. If you had an X-ray or ECG (electrocardiogram), a specialist will review it. You will be notified of any new findings that may affect your care.  Note: If you are age 65 or older, or if you have a chronic lung disease or condition that affects your immune system, or you smoke, talk to your healthcare provider about having a pneumococcal vaccinations and a yearly influenza vaccination (flu shot).  When to seek medical advice  Call your healthcare provider right away if any of these occur:  · Fever of 100.4°F (38°C) or higher  · Coughing up increasing amounts of colored sputum  · Weakness, drowsiness, headache, facial pain, ear pain, or a stiff neck  Call 911, or get immediate medical care  Contact emergency services right away if any of these occur.  · Coughing up blood  · Worsening weakness, drowsiness, headache, or stiff neck  · Increased wheezing not helped with medication, shortness of breath, or pain with breathing  Date Last Reviewed: 9/13/2015  © 6073-9513 [employer]. [address] [place] [address], [place] [address]. All rights reserved. This information is not intended as a substitute for professional medical care. Always follow your healthcare professional's instructions.        Shoulder Problems  Arthritis, injury, bone disease, and torn muscles and tendons can cause pain, stiffness, and sometimes swelling in your shoulder. Then even simple  movements become painful and difficult.    Osteoarthritis  Osteoarthritis is a wearing away of your joint. Your cartilage becomes cracked and pitted, and your socket may wear down. Eventually, your bone is exposed and may develop growths called spurs. Without a cushion of cartilage, your joint becomes stiff and painful. It may feel as if its grinding or slipping out of place when you move your arm.    Inflammatory (rheumatoid) arthritis  Inflammatory arthritis is a chronic joint disease. Your synovium (the membrane that lines your joints) thickens. It then forms a tissue growth (pannus) that clings to your cartilage and releases chemicals that destroy it. Your joint may become red, swollen, and warm. Pain may radiate into your neck and arm. Over time, your joint may get stiff and your muscles may weaken from disuse. Your bone may also be destroyed.     Fracture  A fracture can occur when you fall on an outstretched hand or elbow. The ball and/or tuberosities can break off, leaving your arm bone in pieces. A fractured shoulder is painful and may be black and blue and look deformed.    Avascular necrosis  A number of conditions, including long-term use of steroids or alcohol, can cause the blood supply to your bone to be cut off. As the bone dies, it collapses. Your shoulder becomes painful and movement is limited.    Rotator cuff tear  A chronic rotator cuff tear may lead to severe arthritis. As the ball rides up against your acromion, your joint becomes painful, stiff, and weak. Surgery can relieve the pain, but you may never regain flexibility and strength.  Date Last Reviewed: 9/26/2015  © 6959-4984 [employer]. [address] [place] [address], [place] [address]. All rights reserved. This information is not intended as a substitute for professional medical care. Always follow your healthcare professional's instructions.      Upper Respiratory Infections    The common cold/ viral upper respiratory infection  is an acute, self-limiting infection of the upper respiratory tract characterized by variable degrees of sneezing, nasal congestion and discharge (rhinorrhea), sore throat, cough, low grade fever, headache, and malaise.    Symptoms usually peak on day 3 to 4 of illness and then gradually improve over 7 to 14 days.  A cough may linger for 3 to 4 weeks but should steadily improve over time.     The following information is provided to help you in treating upper respiratory infections.    Decongestant Nasal Sprays  Over-the-counter decongestant nasal spray such as Afrin, may be helpful as an initial step in treating upper respiratory infections. This spray can be used for up to approximately 3 days and is used no more than twice per day. Topical nasal decongestant spray for longer than 5 days will result in a physical addiction, in which the nasal lining will become significantly swollen and irritated until the spray is used again.     Nasal Saline  Nasal saline is available over the counter. There are several different commercial preparations such as Ocean spray and Ayr spray. There is no limit on the use of Nasal saline. Saline is used by snorting the mist up into the nose then later gently blowing the nose to get rid of any secretions that it has loosened.    Nasal Steroids  Nasal steroid medications such as Flonase are useful for upper respiratory infections, allergies, and sensitivities to airborne irritants. Unfortunately, they do not begin to work for 1-2 days, and they do not reach their maximum benefit for approximately 2-3 weeks. Initial therapy is typically 2 puffs per nostril twice per day. This should be used for only a few days, then the maintenance dosage is one or two puffs per nostril once per day. This can be done at any time of the day. The most effective way to use any nasal medication is to look down at your toes when spraying it in. Aim slightly away from the septum (dividing plate between the  nostrils), and gently inhale. This ensures that the spray will go into the sinus cavities and not straight up into the nose. A good way to avoid spraying onto the septum is to use the right hand to spray into the left nostril, and vice versa for the right nostril. Occasionally, nasal steroids can increase the risk of nosebleeds, but in general they are very well tolerated and effective medications.    Antihistamines  Antihistamines are available both over-the-counter and as a prescription. There are also various decongestant and antihistamine combinations available such as Claritin, Allegra, and Zyrtec. It is best to take any antihistamine-decongestant combination in the morning to avoid insomnia. Zyrtec should probably be taken at night, in order to reduce the chance of sleepiness during the daytime. If there is a significant infection present and secretions are already thickened, it is recommended to discontinue antihistamines and use a mucous thinner/decongestant combination.    Mucous Thinners and Decongestants  Mucous thinners and decongestants are used to shrink down the tissues and promote sinus drainage. There are multiple prescription and over-the-counter varieties available. A mucous thinner will tend to be drying unless you are also drinking plenty of water when taking these. If you have high blood pressure, it is very important to monitor your pressure while on decongestants. The mucous thinner/decongestant combinations are typically given twice per day. However, some people will be unable to tolerate these at night and should only take them once per day.    Oral Steroids  Oral steroids can be used with more sever infections. Often, they are the only medications that will reduce the symptoms of pressure and allow the nasal sinuses to drain. These are best taken on a full stomach and earlier in the day is better. They may give you some irritability, stomach upset, or hyperactivity. This can also interfere  with sleep. A person who has high blood pressure or diabetes needs to be very careful to monitor their pressure or blood glucose while taking steroids. Steroids can have multiple side effects when taken long-term, but short-term doses are very well tolerated and extremely effective in controlling the symptoms associated with acute and chronic sinus infections, severe allergies, or nasal polyps. The only significant side effect of note with oral steroids is the extraordinarily uncommon occurrence of damage to the hip cartilage, which is very rare and is usually associated with long-term usage of steroids. The use of steroids for greater than approximately seven days requires a tapering down in order to discontinue them. You should not abruptly stop your steroid if you have been taking the same dose for greater than one week.    Antibiotic Treatment  Finally, when all of these other measures have failed, and a bacterial infection is present, an antibiotic will be prescribed. The most common symptoms of acute sinusitis of a bacterial nature are pain, pressure, and thick and colored nasal drainage. However, not all colored drainage means that there is a bacterial infection present. According to the Center for Disease Control, only 2% of colds will progress to result in bacterial sinusitis. Most upper respiratory infections should NOT be treated with antibiotics. Antibiotics should be reserved for upper respiratory infections which last longer than 10 days, or which worsen after 4 or 5 days of treatment. The use of antibiotics for nonbacterial upper respiratory infections has resulted in a severe problem with the emergence of bacteria which are resistant to multiple forms of antibiotics, and some bacteria are currently only treatable with intravenous antibiotics.    Body Aches/Pains/Fever  For patients who are not allergic to and are not on anticoagulants, you can alternate Tylenol every 4 hours and Motrin every 6 hours  for fever above 100.4F and/or pain.  For patients who are allergic or intolerant to NSAIDS, have gastritis, gastric ulcers, or history of GI bleeds, are pregnant, or are on anticoagulant therapy, you can take Tylenol every 4 hours as needed for fever above 100.4F and/or pain.     Maintain adequate hydration -  Rest and keep yourself/patient well hydrated. For adults, it is recommended to drink at least 8 glasses of water daily.  This may help thin secretions and soothe the respiratory mucosa.     Sore Throat  Perform warm, salt water gargles to help reduce inflammation and throat discomfort. Chloraseptic sprays and throat lozenges will also help with your throat pain.    COUGH  A viral cough may linger for 3 to 4 weeks but should steadily improve over time. Coughing is the body's natural way to clear mucus and help get rid of bacteria and viruses. Therefore, cough suppressants are usually not recommended.  Common cough suppressants include syrups with the ingredient dextromethorphan or DM, available over-the-counter, or prescription syrups containing codeine or hydrocone.  There is no proven benefit and have potential harms.    Dextromethorphan should be avoided in patients taking a monoamine oxidase inhibitor (MAOI).    Dextromethorphan should be used with caution in patients taking serotonergic drugs (e.g., tramadol, SSRIs).     ? Honey may be beneficial, especially on nocturnal cough.  1 to 2 teaspoons can be taken straight or diluted in tea, juice or other liquid.    The antioxidants in honey are an important contributor to its decongestant properties. Generally speaking, darker honey contains more antioxidants. Buckwheat and avocado honey are particularly good choices. If these honeys are not available in your area, choose the darkest honey you can find.    .  Take all medications as directed. If you have been prescribed antibiotics, make sure to complete them.   .  If your condition fails to improve in a  timely manner, you should receive another evaluation by your Primary Care Provider to discuss your concerns or return to urgent care for a recheck.      **You must understand that you have received Urgent Care treatment only and that you may be released before all of your medical problems are known or treated. You, the patient, are responsible to arrange for follow-up care as instructed. If your condition worsens at any time, you should report immediately to your nearest Emergency Department for further evaluation.

## 2021-07-11 ENCOUNTER — HOSPITAL ENCOUNTER (EMERGENCY)
Facility: HOSPITAL | Age: 41
Discharge: HOME | End: 2021-07-11
Attending: EMERGENCY MEDICINE
Payer: MEDICAID

## 2021-07-11 VITALS
OXYGEN SATURATION: 98 % | RESPIRATION RATE: 20 BRPM | TEMPERATURE: 97 F | HEART RATE: 89 BPM | DIASTOLIC BLOOD PRESSURE: 70 MMHG | BODY MASS INDEX: 48.15 KG/M2 | SYSTOLIC BLOOD PRESSURE: 149 MMHG | WEIGHT: 293 LBS

## 2021-07-11 DIAGNOSIS — F43.0 STRESS REACTION: Primary | ICD-10-CM

## 2021-07-11 LAB — POCT GLUCOSE: 87 MG/DL (ref 70–110)

## 2021-07-11 PROCEDURE — 82962 GLUCOSE BLOOD TEST: CPT

## 2021-07-11 PROCEDURE — 99283 EMERGENCY DEPT VISIT LOW MDM: CPT

## 2021-07-11 RX ORDER — ALPRAZOLAM 0.5 MG/1
0.5 TABLET ORAL 3 TIMES DAILY PRN
Qty: 12 TABLET | Refills: 0 | Status: SHIPPED | OUTPATIENT
Start: 2021-07-11 | End: 2021-07-15

## 2021-07-18 ENCOUNTER — HOSPITAL ENCOUNTER (EMERGENCY)
Facility: HOSPITAL | Age: 41
Discharge: HOME | End: 2021-07-18
Attending: FAMILY MEDICINE
Payer: MEDICAID

## 2021-07-18 VITALS
HEART RATE: 96 BPM | TEMPERATURE: 97 F | WEIGHT: 293 LBS | RESPIRATION RATE: 24 BRPM | OXYGEN SATURATION: 95 % | DIASTOLIC BLOOD PRESSURE: 80 MMHG | BODY MASS INDEX: 48.44 KG/M2 | SYSTOLIC BLOOD PRESSURE: 130 MMHG

## 2021-07-18 DIAGNOSIS — R05.9 COUGH: Primary | ICD-10-CM

## 2021-07-18 LAB — SARS-COV-2 RDRP RESP QL NAA+PROBE: NEGATIVE

## 2021-07-18 PROCEDURE — 99284 EMERGENCY DEPT VISIT MOD MDM: CPT | Mod: 25

## 2021-07-18 PROCEDURE — U0002 COVID-19 LAB TEST NON-CDC: HCPCS | Performed by: FAMILY MEDICINE

## 2021-07-18 PROCEDURE — 96372 THER/PROPH/DIAG INJ SC/IM: CPT

## 2021-07-18 PROCEDURE — 25000242 PHARM REV CODE 250 ALT 637 W/ HCPCS: Performed by: FAMILY MEDICINE

## 2021-07-18 PROCEDURE — 94640 AIRWAY INHALATION TREATMENT: CPT

## 2021-07-18 PROCEDURE — 63600175 PHARM REV CODE 636 W HCPCS: Performed by: FAMILY MEDICINE

## 2021-07-18 PROCEDURE — 99900031 HC PATIENT EDUCATION (STAT)

## 2021-07-18 RX ORDER — DEXAMETHASONE SODIUM PHOSPHATE 4 MG/ML
8 INJECTION, SOLUTION INTRA-ARTICULAR; INTRALESIONAL; INTRAMUSCULAR; INTRAVENOUS; SOFT TISSUE
Status: COMPLETED | OUTPATIENT
Start: 2021-07-18 | End: 2021-07-18

## 2021-07-18 RX ORDER — CHLORPHENIRAMINE MALEATE / PSEUDOEPHEDRINE HCL 2; 30 MG/5ML; MG/5ML
5 LIQUID ORAL 4 TIMES DAILY PRN
Qty: 473 ML | Refills: 0 | Status: SHIPPED | OUTPATIENT
Start: 2021-07-18 | End: 2021-07-28

## 2021-07-18 RX ORDER — ALBUTEROL SULFATE 90 UG/1
2 AEROSOL, METERED RESPIRATORY (INHALATION) EVERY 6 HOURS PRN
Qty: 18 G | Refills: 0 | Status: SHIPPED | OUTPATIENT
Start: 2021-07-18

## 2021-07-18 RX ORDER — METHYLPREDNISOLONE SOD SUCC 125 MG
125 VIAL (EA) INJECTION
Status: COMPLETED | OUTPATIENT
Start: 2021-07-18 | End: 2021-07-18

## 2021-07-18 RX ORDER — IPRATROPIUM BROMIDE AND ALBUTEROL SULFATE 2.5; .5 MG/3ML; MG/3ML
3 SOLUTION RESPIRATORY (INHALATION)
Status: COMPLETED | OUTPATIENT
Start: 2021-07-18 | End: 2021-07-18

## 2021-07-18 RX ORDER — PREDNISONE 20 MG/1
40 TABLET ORAL DAILY
Qty: 10 TABLET | Refills: 0 | Status: SHIPPED | OUTPATIENT
Start: 2021-07-18 | End: 2021-07-23

## 2021-07-18 RX ADMIN — METHYLPREDNISOLONE SODIUM SUCCINATE 125 MG: 125 INJECTION, POWDER, FOR SOLUTION INTRAMUSCULAR; INTRAVENOUS at 01:07

## 2021-07-18 RX ADMIN — DEXAMETHASONE SODIUM PHOSPHATE 8 MG: 4 INJECTION, SOLUTION INTRAMUSCULAR; INTRAVENOUS at 02:07

## 2021-07-18 RX ADMIN — IPRATROPIUM BROMIDE AND ALBUTEROL SULFATE 3 ML: .5; 3 SOLUTION RESPIRATORY (INHALATION) at 02:07
